# Patient Record
Sex: MALE | Race: WHITE | NOT HISPANIC OR LATINO | Employment: OTHER | URBAN - METROPOLITAN AREA
[De-identification: names, ages, dates, MRNs, and addresses within clinical notes are randomized per-mention and may not be internally consistent; named-entity substitution may affect disease eponyms.]

---

## 2018-04-13 DIAGNOSIS — M54.50 LOW BACK PAIN: ICD-10-CM

## 2018-04-13 DIAGNOSIS — M54.16 LUMBAR RADICULOPATHY: Primary | ICD-10-CM

## 2019-01-22 PROBLEM — I20.9 ANGINA, CLASS III: Status: ACTIVE | Noted: 2019-01-22

## 2019-01-23 PROBLEM — I35.0 NONRHEUMATIC AORTIC VALVE STENOSIS: Status: ACTIVE | Noted: 2019-01-23

## 2019-01-23 PROBLEM — I20.9 ANGINA, CLASS III: Status: RESOLVED | Noted: 2019-01-22 | Resolved: 2019-01-23

## 2019-01-23 PROBLEM — I10 ESSENTIAL HYPERTENSION: Status: ACTIVE | Noted: 2019-01-23

## 2019-02-04 ENCOUNTER — TELEPHONE (OUTPATIENT)
Dept: CARDIOLOGY | Facility: CLINIC | Age: 84
End: 2019-02-04

## 2019-02-04 DIAGNOSIS — I50.9 CONGESTIVE HEART FAILURE, UNSPECIFIED HF CHRONICITY, UNSPECIFIED HEART FAILURE TYPE: ICD-10-CM

## 2019-02-04 DIAGNOSIS — I35.0 AORTIC VALVE STENOSIS, ETIOLOGY OF CARDIAC VALVE DISEASE UNSPECIFIED: Primary | ICD-10-CM

## 2019-02-04 NOTE — TELEPHONE ENCOUNTER
Patient referred for TAVR by Dr. Pickett.  Records reviewed/films on [facility].  Patient would like to call back to schedule.  He is undergoing injections in knee/shoulder and is in a lot of pain.  He will call back and schedule when ready.

## 2019-02-20 ENCOUNTER — OFFICE VISIT (OUTPATIENT)
Dept: CARDIOLOGY | Facility: CLINIC | Age: 84
End: 2019-02-20
Attending: INTERNAL MEDICINE
Payer: MEDICARE

## 2019-02-20 ENCOUNTER — HOSPITAL ENCOUNTER (OUTPATIENT)
Dept: PULMONOLOGY | Facility: CLINIC | Age: 84
Discharge: HOME | End: 2019-02-20
Payer: MEDICARE

## 2019-02-20 ENCOUNTER — HOSPITAL ENCOUNTER (OUTPATIENT)
Dept: RADIOLOGY | Facility: HOSPITAL | Age: 84
Discharge: HOME | End: 2019-02-20
Attending: INTERNAL MEDICINE
Payer: MEDICARE

## 2019-02-20 ENCOUNTER — HOSPITAL ENCOUNTER (OUTPATIENT)
Dept: CARDIOLOGY | Facility: CLINIC | Age: 84
Discharge: HOME | End: 2019-02-20
Attending: INTERNAL MEDICINE
Payer: MEDICARE

## 2019-02-20 VITALS
WEIGHT: 176.13 LBS | HEART RATE: 85 BPM | SYSTOLIC BLOOD PRESSURE: 146 MMHG | HEIGHT: 66 IN | OXYGEN SATURATION: 95 % | BODY MASS INDEX: 28.31 KG/M2 | WEIGHT: 174 LBS | HEIGHT: 66 IN | DIASTOLIC BLOOD PRESSURE: 66 MMHG | SYSTOLIC BLOOD PRESSURE: 136 MMHG | BODY MASS INDEX: 27.97 KG/M2 | DIASTOLIC BLOOD PRESSURE: 66 MMHG | HEART RATE: 95 BPM

## 2019-02-20 VITALS — HEIGHT: 66 IN | WEIGHT: 174 LBS | BODY MASS INDEX: 27.97 KG/M2

## 2019-02-20 DIAGNOSIS — I35.0 NODULAR CALCIFIC AORTIC VALVE STENOSIS: Primary | ICD-10-CM

## 2019-02-20 DIAGNOSIS — I35.0 AORTIC VALVE STENOSIS, ETIOLOGY OF CARDIAC VALVE DISEASE UNSPECIFIED: ICD-10-CM

## 2019-02-20 DIAGNOSIS — N18.9 CHRONIC KIDNEY DISEASE, UNSPECIFIED CKD STAGE: ICD-10-CM

## 2019-02-20 DIAGNOSIS — E78.5 DYSLIPIDEMIA: ICD-10-CM

## 2019-02-20 DIAGNOSIS — I50.9 CONGESTIVE HEART FAILURE, UNSPECIFIED HF CHRONICITY, UNSPECIFIED HEART FAILURE TYPE: ICD-10-CM

## 2019-02-20 DIAGNOSIS — I35.0 NONRHEUMATIC AORTIC VALVE STENOSIS: Primary | ICD-10-CM

## 2019-02-20 DIAGNOSIS — I10 ESSENTIAL HYPERTENSION: ICD-10-CM

## 2019-02-20 DIAGNOSIS — I50.32 CHRONIC DIASTOLIC CONGESTIVE HEART FAILURE: ICD-10-CM

## 2019-02-20 LAB
ASCENDING AORTA: 2.4 CM
AV INDEX (PROSTH): 0.21
AV MEAN GRADIENT: 50.45 MMHG
AV PEAK GRADIENT: 79.57 MMHG
AV VALVE AREA: 0.97 CM2
AV VELOCITY RATIO: 0.22
BSA FOR ECHO PROCEDURE: 1.92 M2
CV ECHO LV RWT: 0.57 CM
DOP CALC AO PEAK VEL: 4.46 M/S
DOP CALC AO VTI: 90.43 CM
DOP CALC LVOT AREA: 4.52 CM2
DOP CALC LVOT DIAMETER: 2.4 CM
DOP CALC LVOT PEAK VEL: 0.96 M/S
DOP CALC LVOT STROKE VOLUME: 87.63 CM3
DOP CALCLVOT PEAK VEL VTI: 19.38 CM
E WAVE DECELERATION TIME: 147.69 MSEC
E/A RATIO: 0.62
E/E' RATIO: 7.71
ECHO LV POSTERIOR WALL: 1.02 CM (ref 0.6–1.1)
FRACTIONAL SHORTENING: 28 % (ref 28–44)
INTERVENTRICULAR SEPTUM: 0.96 CM (ref 0.6–1.1)
IVRT: 0.1 MSEC
LA MAJOR: 4.77 CM
LA MINOR: 4.68 CM
LA WIDTH: 3.42 CM
LEFT ATRIUM SIZE: 3.6 CM
LEFT ATRIUM VOLUME INDEX: 26.2 ML/M2
LEFT ATRIUM VOLUME: 49.44 CM3
LEFT INTERNAL DIMENSION IN SYSTOLE: 2.6 CM (ref 2.1–4)
LEFT VENTRICLE DIASTOLIC VOLUME INDEX: 29.03 ML/M2
LEFT VENTRICLE DIASTOLIC VOLUME: 54.73 ML
LEFT VENTRICLE MASS INDEX: 56.6 G/M2
LEFT VENTRICLE SYSTOLIC VOLUME INDEX: 13 ML/M2
LEFT VENTRICLE SYSTOLIC VOLUME: 24.55 ML
LEFT VENTRICULAR INTERNAL DIMENSION IN DIASTOLE: 3.61 CM (ref 3.5–6)
LEFT VENTRICULAR MASS: 106.79 G
LV LATERAL E/E' RATIO: 7.71
LV SEPTAL E/E' RATIO: 7.71
MV PEAK A VEL: 0.87 M/S
MV PEAK E VEL: 0.54 M/S
PISA TR MAX VEL: 1.62 M/S
PRE FEV1 FVC: 64
PRE FEV1: 1.62
PRE FVC: 2.55
PREDICTED FEV1 FVC: 78
PREDICTED FEV1: 2.11
PREDICTED FVC: 2.76
RA MAJOR: 4.7 CM
RA PRESSURE: 3 MMHG
RA WIDTH: 3.25 CM
RIGHT VENTRICULAR END-DIASTOLIC DIMENSION: 3.97 CM
RV TISSUE DOPPLER FREE WALL SYSTOLIC VELOCITY 1 (APICAL 4 CHAMBER VIEW): 11.05 M/S
SINUS: 3.04 CM
STJ: 2.29 CM
TDI LATERAL: 0.07
TDI SEPTAL: 0.07
TDI: 0.07
TR MAX PG: 10.5 MMHG
TRICUSPID ANNULAR PLANE SYSTOLIC EXCURSION: 1.9 CM
TV REST PULMONARY ARTERY PRESSURE: 13 MMHG

## 2019-02-20 PROCEDURE — 74174 CTA CARDIAC TAVR_PARTNERS (XPD): ICD-10-PCS | Mod: 26,,, | Performed by: RADIOLOGY

## 2019-02-20 PROCEDURE — 99999 PR PBB SHADOW E&M-EST. PATIENT-LVL III: CPT | Mod: PBBFAC,,,

## 2019-02-20 PROCEDURE — 94727 PR PULM FUNCTION TEST BY GAS: ICD-10-PCS | Mod: 26,S$PBB,, | Performed by: INTERNAL MEDICINE

## 2019-02-20 PROCEDURE — 94729 DIFFUSING CAPACITY: CPT | Mod: PBBFAC | Performed by: INTERNAL MEDICINE

## 2019-02-20 PROCEDURE — 99205 PR OFFICE/OUTPT VISIT, NEW, LEVL V, 60-74 MIN: ICD-10-PCS | Mod: S$PBB,,, | Performed by: INTERNAL MEDICINE

## 2019-02-20 PROCEDURE — 94618 PULMONARY STRESS TESTING: CPT | Mod: 26,S$PBB,, | Performed by: INTERNAL MEDICINE

## 2019-02-20 PROCEDURE — 99213 OFFICE O/P EST LOW 20 MIN: CPT | Mod: PBBFAC,25

## 2019-02-20 PROCEDURE — 94729 PR C02/MEMBANE DIFFUSE CAPACITY: ICD-10-PCS | Mod: 26,S$PBB,, | Performed by: INTERNAL MEDICINE

## 2019-02-20 PROCEDURE — 94727 GAS DIL/WSHOT DETER LNG VOL: CPT | Mod: PBBFAC | Performed by: INTERNAL MEDICINE

## 2019-02-20 PROCEDURE — 94010 BREATHING CAPACITY TEST: ICD-10-PCS | Mod: 26,S$PBB,, | Performed by: INTERNAL MEDICINE

## 2019-02-20 PROCEDURE — 25500020 PHARM REV CODE 255

## 2019-02-20 PROCEDURE — 99999 PR PBB SHADOW E&M-EST. PATIENT-LVL III: ICD-10-PCS | Mod: PBBFAC,,,

## 2019-02-20 PROCEDURE — 94618 PULMONARY STRESS TESTING: ICD-10-PCS | Mod: 26,S$PBB,, | Performed by: INTERNAL MEDICINE

## 2019-02-20 PROCEDURE — 71275 CT ANGIOGRAPHY CHEST: CPT | Mod: 26,,, | Performed by: RADIOLOGY

## 2019-02-20 PROCEDURE — 93306 TRANSTHORACIC ECHO (TTE) COMPLETE (CUPID ONLY): ICD-10-PCS | Mod: 26,S$PBB,, | Performed by: INTERNAL MEDICINE

## 2019-02-20 PROCEDURE — 94729 DIFFUSING CAPACITY: CPT | Mod: 26,S$PBB,, | Performed by: INTERNAL MEDICINE

## 2019-02-20 PROCEDURE — 94727 GAS DIL/WSHOT DETER LNG VOL: CPT | Mod: 26,S$PBB,, | Performed by: INTERNAL MEDICINE

## 2019-02-20 PROCEDURE — 99205 OFFICE O/P NEW HI 60 MIN: CPT | Mod: S$PBB,,, | Performed by: INTERNAL MEDICINE

## 2019-02-20 PROCEDURE — 94618 PULMONARY STRESS TESTING: CPT | Mod: PBBFAC | Performed by: INTERNAL MEDICINE

## 2019-02-20 PROCEDURE — 71275 CTA CARDIAC TAVR_PARTNERS (XPD): ICD-10-PCS | Mod: 26,,, | Performed by: RADIOLOGY

## 2019-02-20 PROCEDURE — 74174 CTA ABD&PLVS W/CONTRAST: CPT | Mod: 26,,, | Performed by: RADIOLOGY

## 2019-02-20 PROCEDURE — 94010 BREATHING CAPACITY TEST: CPT | Mod: PBBFAC | Performed by: INTERNAL MEDICINE

## 2019-02-20 PROCEDURE — 71275 CT ANGIOGRAPHY CHEST: CPT | Mod: TC

## 2019-02-20 PROCEDURE — 94010 BREATHING CAPACITY TEST: CPT | Mod: 26,S$PBB,, | Performed by: INTERNAL MEDICINE

## 2019-02-20 PROCEDURE — 93306 TTE W/DOPPLER COMPLETE: CPT | Mod: PBBFAC | Performed by: INTERNAL MEDICINE

## 2019-02-20 RX ORDER — DIPHENHYDRAMINE HCL 25 MG
50 CAPSULE ORAL ONCE
Status: CANCELLED | OUTPATIENT
Start: 2019-02-20 | End: 2019-02-20

## 2019-02-20 RX ORDER — DEXTROSE MONOHYDRATE AND SODIUM CHLORIDE 5; .45 G/100ML; G/100ML
INJECTION, SOLUTION INTRAVENOUS CONTINUOUS
Status: CANCELLED | OUTPATIENT
Start: 2019-02-20

## 2019-02-20 RX ORDER — CLOPIDOGREL BISULFATE 75 MG/1
75 TABLET ORAL DAILY
Qty: 30 TABLET | Refills: 11 | Status: SHIPPED | OUTPATIENT
Start: 2019-02-20 | End: 2020-06-16

## 2019-02-20 NOTE — PROGRESS NOTES
INTERVENTIONAL CARDIOLOGY CLINIC  [facility]    REFERRING PHYSICIAN: Bam Pickett    CHIEF COMPLIANT:  Shortness of breath    HISTORY OF PRESENT ILLNESS  Edmond J Favre is a 87 y.o. male referred by Dr Pickett for evaluation of severe AS (NYHA Class III sx).    Patient has been follewd by Bam Pickett for several years and known to have AS. 4.5 months ago he started toe experience dyspnea on exertion. He gets short of breath doing regular household chores. He had angiogram that showed non obstructive CAD. Outside echo showed severe AS. He was referred for valve replacement options.     The patient has undergone the following TAVR work-up:   ECHO (Date 2.20.19): SUDHAKAR= 0.9 cm2, MG= 50 mmHg, Peak Cyrus= 4.4 m/s, EF= 65%.   LHC (Date 1.23.2019): Non obstructive CAD   STS: 6%   Frailty: 1/4   Iliacs are >9.5 on R and > 9.5 on L   LVOT area by CTA is 4.44 cm2 (27 mm X 21 mm) and Avg Diameter is 23.8 per Dr Crowe  Incidental findings on CT: Asbestosis  CT Surgery risk assessment: high risk, per Dr Damon due to Age  Rhythm issues: NSR  PFTs: FEV1 77% predicted, DLCO 58% predicted.  Comorbidities: Hypertension, shoulder arthritis      Edmond J Favre is a 26 mm  Jermaine S3 valve candidate via Left TF access (18% oversized). (High bifurcation on RCFA)        PAST MEDICAL HISTORY  Past Medical History:   Diagnosis Date    Anxiety     Aortic stenosis, severe     Carotid artery stenosis     bilateral    Depression     Diverticulitis     Hyperlipidemia         PAST SURGICAL HISTORY  Past Surgical History:   Procedure Laterality Date    ANGIOGRAM, CORONARY ARTERY N/A 1/23/2019    [facility]    COLECTOMY         MEDICATIONS  Current Outpatient Medications on File Prior to Visit   Medication Sig Dispense Refill    aspirin 81 MG Chew Take 81 mg by mouth once daily.      doxazosin (CARDURA) 4 MG tablet Take 4 mg by mouth every evening.      FLUoxetine 20 MG capsule Take 20 mg by  mouth once daily.      hydrOXYzine (ATARAX) 50 MG tablet Take 50 mg by mouth 2 (two) times daily.      lisinopril (PRINIVIL,ZESTRIL) 5 MG tablet Take 5 mg by mouth once daily.      multivitamin capsule Take 1 capsule by mouth once daily.      simvastatin (ZOCOR) 40 MG tablet Take 40 mg by mouth every evening.      clotrimazole (LOTRIMIN) 1 % Soln Apply topically 2 (two) times daily.       Current Facility-Administered Medications on File Prior to Visit   Medication Dose Route Frequency Provider Last Rate Last Dose    omnipaque 350 iohexol 100 mL  100 mL Intravenous ONCE PRN Ventura Whiteside MD            SOCIAL HISTORY  TOBACCO: Denies  ETOH: Denies  ILLEGAL DRUGS: Denies      HPI  Review of Systems   Constitution: Negative for fever and weakness.   HENT: Negative for congestion and hoarse voice.    Eyes: Negative for blurred vision and double vision.   Cardiovascular: Positive for dyspnea on exertion. Negative for chest pain, claudication, cyanosis, irregular heartbeat, leg swelling, near-syncope, orthopnea, palpitations and paroxysmal nocturnal dyspnea.   Respiratory: Negative for cough, hemoptysis and shortness of breath.    Endocrine: Negative for cold intolerance and heat intolerance.   Hematologic/Lymphatic: Negative for bleeding problem. Does not bruise/bleed easily.   Skin: Negative for dry skin and nail changes.   Musculoskeletal: Negative for back pain and falls.   Gastrointestinal: Negative for abdominal pain and anorexia.   Neurological: Negative for brief paralysis and dizziness.        Objective:    Physical Exam   Constitutional: He is oriented to person, place, and time. He appears well-developed and well-nourished.   Eyes: Pupils are equal, round, and reactive to light.   Neck: No JVD present. No thyromegaly present.   Cardiovascular: Normal rate, regular rhythm and intact distal pulses.   Murmur heard.   Harsh midsystolic murmur is present with a grade of 3/6 at the upper right sternal  border radiating to the neck.  Pulses:       Carotid pulses are 2+ on the right side, and 2+ on the left side.       Radial pulses are 2+ on the right side, and 2+ on the left side.        Femoral pulses are 2+ on the right side, and 2+ on the left side.       Popliteal pulses are 2+ on the right side, and 2+ on the left side.        Dorsalis pedis pulses are 2+ on the right side, and 2+ on the left side.        Posterior tibial pulses are 2+ on the right side, and 2+ on the left side.   Pulmonary/Chest: No respiratory distress. He has no wheezes. He exhibits no tenderness.   Abdominal: He exhibits no distension. There is no tenderness. There is no rebound.   Musculoskeletal: He exhibits no edema or tenderness.   Neurological: He is alert and oriented to person, place, and time.   Skin: Skin is warm and dry.   Psychiatric: He has a normal mood and affect.         Assessment:       1. Nonrheumatic aortic valve stenosis    2. Essential hypertension    3. Chronic diastolic congestive heart failure    4. Dyslipidemia         Plan:         Essential hypertension  Controlled    Dyslipidemia  On high intensity statin Rx    Congestive heart failure  Secondary to severe AS    TAVR work-up:   ECHO (Date 2.20.19): SUDHAKAR= 0.9 cm2, MG= 50 mmHg, Peak Cyrus= 4.4 m/s, EF= 65%.   [facility] (Date 1.23.2019): Non obstructive CAD   STS: 6%   Frailty: 1/4   Iliacs are >9.5 on R and > 9.5 on L   LVOT area by CTA is 4.44 cm2 (27 mm X 21 mm) and Avg Diameter is 23.8 per Dr Crowe  Incidental findings on CT: Asbestosis  CT Surgery risk assessment: high risk, per Dr Damon due to Age  Rhythm issues: NSR  PFTs: FEV1 77% predicted, DLCO 58% predicted.  Comorbidities: Hypertension, shoulder arthritis    Edmond J Favre is a 26 mm  Jermaine S3 valve candidate via Left TF access (18% oversized). (High bifurcation on RCFA)      Nonrheumatic aortic valve stenosis  Transcatheter Aortic valve replacement   1. Valve: 26 mm Jermaine S3  2. TAVR access:  LTF  3. Valvuloplasty balloon: 20 True  4. Viabahn size if needed: 10X5  5. Antithrombotic therapy: ASA, start plavix  6. Pacemaker risk factors: NSR  1. The patient was explained the the procedure carries around a 12.5% risk of permanent pacemaker requirement and that risk depends on the patients underlying conduction system.  7. Patient was educated abut the pathophysiology and natural history of severe aortic stenosis and was educated about the treatment options including surgical and transcatheter valve replacement. She agrees to be full code for the forseable future and to undergo workup for treatment of severe AS.   8. The risks, benefits, and alternatives of transcatheter aortic valve replacement were discussed with the patient. All questions were answered and informed consent was obtained. I had a detailed discussion with the patient regarding risk of stroke, MI, bleeding access site complications including limb loss, allergy, kidney failure including dialysis and death.  9. The patient understands the risks and benefits and wishes to go ahead with the procedure.  10. All patient's questions were answered          Cristian Crowe MD [facility]  Interventional Cardiology  Structural/Valvular heart disease  [phone]

## 2019-02-20 NOTE — H&P (VIEW-ONLY)
INTERVENTIONAL CARDIOLOGY CLINIC  [facility]    REFERRING PHYSICIAN: Bam Pickett    CHIEF COMPLIANT:  Shortness of breath    HISTORY OF PRESENT ILLNESS  Edmond J Favre is a 87 y.o. male referred by Dr Pickett for evaluation of severe AS (NYHA Class III sx).    Patient has been follewd by Bam Pickett for several years and known to have AS. 4.5 months ago he started toe experience dyspnea on exertion. He gets short of breath doing regular household chores. He had angiogram that showed non obstructive CAD. Outside echo showed severe AS. He was referred for valve replacement options.     The patient has undergone the following TAVR work-up:   ECHO (Date 2.20.19): SUDHAKAR= 0.9 cm2, MG= 50 mmHg, Peak Cyrus= 4.4 m/s, EF= 65%.   LHC (Date 1.23.2019): Non obstructive CAD   STS: 6%   Frailty: 1/4   Iliacs are >9.5 on R and > 9.5 on L   LVOT area by CTA is 4.44 cm2 (27 mm X 21 mm) and Avg Diameter is 23.8 per Dr Crowe  Incidental findings on CT: Asbestosis  CT Surgery risk assessment: high risk, per Dr Damon due to Age  Rhythm issues: NSR  PFTs: FEV1 77% predicted, DLCO 58% predicted.  Comorbidities: Hypertension, shoulder arthritis      Edmond J Favre is a 26 mm  Jermaine S3 valve candidate via Left TF access (18% oversized). (High bifurcation on RCFA)        PAST MEDICAL HISTORY  Past Medical History:   Diagnosis Date    Anxiety     Aortic stenosis, severe     Carotid artery stenosis     bilateral    Depression     Diverticulitis     Hyperlipidemia         PAST SURGICAL HISTORY  Past Surgical History:   Procedure Laterality Date    ANGIOGRAM, CORONARY ARTERY N/A 1/23/2019    Performed by [facility] at [facility] C[facility] LAB    COLECTOMY         MEDICATIONS  Current Outpatient Medications on File Prior to Visit   Medication Sig Dispense Refill    aspirin 81 MG Chew Take 81 mg by mouth once daily.      doxazosin (CARDURA) 4 MG tablet Take 4 mg by mouth every evening.      FLUoxetine 20 MG capsule Take 20 mg by  mouth once daily.      hydrOXYzine (ATARAX) 50 MG tablet Take 50 mg by mouth 2 (two) times daily.      lisinopril (PRINIVIL,ZESTRIL) 5 MG tablet Take 5 mg by mouth once daily.      multivitamin capsule Take 1 capsule by mouth once daily.      simvastatin (ZOCOR) 40 MG tablet Take 40 mg by mouth every evening.      clotrimazole (LOTRIMIN) 1 % Soln Apply topically 2 (two) times daily.       Current Facility-Administered Medications on File Prior to Visit   Medication Dose Route Frequency Provider Last Rate Last Dose    omnipaque 350 iohexol 100 mL  100 mL Intravenous ONCE PRN Ventura Whiteside MD            SOCIAL HISTORY  TOBACCO: Denies  ETOH: Denies  ILLEGAL DRUGS: Denies      HPI  Review of Systems   Constitution: Negative for fever and weakness.   HENT: Negative for congestion and hoarse voice.    Eyes: Negative for blurred vision and double vision.   Cardiovascular: Positive for dyspnea on exertion. Negative for chest pain, claudication, cyanosis, irregular heartbeat, leg swelling, near-syncope, orthopnea, palpitations and paroxysmal nocturnal dyspnea.   Respiratory: Negative for cough, hemoptysis and shortness of breath.    Endocrine: Negative for cold intolerance and heat intolerance.   Hematologic/Lymphatic: Negative for bleeding problem. Does not bruise/bleed easily.   Skin: Negative for dry skin and nail changes.   Musculoskeletal: Negative for back pain and falls.   Gastrointestinal: Negative for abdominal pain and anorexia.   Neurological: Negative for brief paralysis and dizziness.        Objective:    Physical Exam   Constitutional: He is oriented to person, place, and time. He appears well-developed and well-nourished.   Eyes: Pupils are equal, round, and reactive to light.   Neck: No JVD present. No thyromegaly present.   Cardiovascular: Normal rate, regular rhythm and intact distal pulses.   Murmur heard.   Harsh midsystolic murmur is present with a grade of 3/6 at the upper right sternal  border radiating to the neck.  Pulses:       Carotid pulses are 2+ on the right side, and 2+ on the left side.       Radial pulses are 2+ on the right side, and 2+ on the left side.        Femoral pulses are 2+ on the right side, and 2+ on the left side.       Popliteal pulses are 2+ on the right side, and 2+ on the left side.        Dorsalis pedis pulses are 2+ on the right side, and 2+ on the left side.        Posterior tibial pulses are 2+ on the right side, and 2+ on the left side.   Pulmonary/Chest: No respiratory distress. He has no wheezes. He exhibits no tenderness.   Abdominal: He exhibits no distension. There is no tenderness. There is no rebound.   Musculoskeletal: He exhibits no edema or tenderness.   Neurological: He is alert and oriented to person, place, and time.   Skin: Skin is warm and dry.   Psychiatric: He has a normal mood and affect.         Assessment:       1. Nonrheumatic aortic valve stenosis    2. Essential hypertension    3. Chronic diastolic congestive heart failure    4. Dyslipidemia         Plan:         Essential hypertension  Controlled    Dyslipidemia  On high intensity statin Rx    Congestive heart failure  Secondary to severe AS    TAVR work-up:   ECHO (Date 2.20.19): SUDHAKAR= 0.9 cm2, MG= 50 mmHg, Peak Cyrus= 4.4 m/s, EF= 65%.   [facility] (Date 1.23.2019): Non obstructive CAD   STS: 6%   Frailty: 1/4   Iliacs are >9.5 on R and > 9.5 on L   LVOT area by CTA is 4.44 cm2 (27 mm X 21 mm) and Avg Diameter is 23.8 per Dr Crowe  Incidental findings on CT: Asbestosis  CT Surgery risk assessment: high risk, per Dr Damon due to Age  Rhythm issues: NSR  PFTs: FEV1 77% predicted, DLCO 58% predicted.  Comorbidities: Hypertension, shoulder arthritis    Edmond J Favre is a 26 mm  Jermaine S3 valve candidate via Left TF access (18% oversized). (High bifurcation on RCFA)      Nonrheumatic aortic valve stenosis  Transcatheter Aortic valve replacement   1. Valve: 26 mm Jermaine S3  2. TAVR access:  LTF  3. Valvuloplasty balloon: 20 True  4. Viabahn size if needed: 10X5  5. Antithrombotic therapy: ASA, start plavix  6. Pacemaker risk factors: NSR  1. The patient was explained the the procedure carries around a 12.5% risk of permanent pacemaker requirement and that risk depends on the patients underlying conduction system.  7. Patient was educated abut the pathophysiology and natural history of severe aortic stenosis and was educated about the treatment options including surgical and transcatheter valve replacement. She agrees to be full code for the forseable future and to undergo workup for treatment of severe AS.   8. The risks, benefits, and alternatives of transcatheter aortic valve replacement were discussed with the patient. All questions were answered and informed consent was obtained. I had a detailed discussion with the patient regarding risk of stroke, MI, bleeding access site complications including limb loss, allergy, kidney failure including dialysis and death.  9. The patient understands the risks and benefits and wishes to go ahead with the procedure.  10. All patient's questions were answered          Cristian Crowe MD [facility]  Interventional Cardiology  Structural/Valvular heart disease  [phone]

## 2019-02-20 NOTE — LETTER
February 20, 2019      Ventura Whiteside MD  [address]  [facility] [address]           [facility]-Inter[facility] Card  [address]  [facility] [address]  Phone: [phone]          Patient: Edmond J Favre   MR Number: [id number]   YOB: 1931   Date of Visit: 2/20/2019       Dear Dr. Ventura Whiteside:    Thank you for referring Edmond Favre to me for evaluation. Attached you will find relevant portions of my assessment and plan of care.    If you have questions, please do not hesitate to call me. I look forward to following Edmond Favre along with you.    Sincerely,    Cristian Zambrano MD    Enclosure  CC:  No Recipients    If you would like to receive this communication electronically, please contact [email or web address] or [phone] to request more information on [facility] Link access.    For providers and/or their staff who would like to refer a patient to [facility], please contact us through our one-stop-shop provider referral line, [facility], at [phone].    If you feel you have received this communication in error or would no longer like to receive these types of communications, please e-mail [email or web address]

## 2019-02-20 NOTE — ASSESSMENT & PLAN NOTE
Transcatheter Aortic valve replacement   1. Valve: 26 mm Jermaine S3  2. TAVR access: LTF  3. Valvuloplasty balloon: 20 True  4. Viabahn size if needed: 10X5  5. Antithrombotic therapy: ASA, start plavix  6. Pacemaker risk factors: NSR  1. The patient was explained the the procedure carries around a 12.5% risk of permanent pacemaker requirement and that risk depends on the patients underlying conduction system.  7. Patient was educated abut the pathophysiology and natural history of severe aortic stenosis and was educated about the treatment options including surgical and transcatheter valve replacement. She agrees to be full code for the forseable future and to undergo workup for treatment of severe AS.   8. The risks, benefits, and alternatives of transcatheter aortic valve replacement were discussed with the patient. All questions were answered and informed consent was obtained. I had a detailed discussion with the patient regarding risk of stroke, MI, bleeding access site complications including limb loss, allergy, kidney failure including dialysis and death.  9. The patient understands the risks and benefits and wishes to go ahead with the procedure.  10. All patient's questions were answered

## 2019-02-20 NOTE — ASSESSMENT & PLAN NOTE
Secondary to severe AS    TAVR work-up:   ECHO (Date 2.20.19): SUDHAKAR= 0.9 cm2, MG= 50 mmHg, Peak Cyrus= 4.4 m/s, EF= 65%.   [facility] (Date 1.23.2019): Non obstructive CAD   STS: 6%   Frailty: 1/4   Iliacs are >9.5 on R and > 9.5 on L   LVOT area by CTA is 4.44 cm2 (27 mm X 21 mm) and Avg Diameter is 23.8 per Dr Crowe  Incidental findings on CT: Asbestosis  CT Surgery risk assessment: high risk, per Dr Damon due to Age  Rhythm issues: NSR  PFTs: FEV1 77% predicted, DLCO 58% predicted.  Comorbidities: Hypertension, shoulder arthritis    Edmond J Favre is a 26 mm  Jermaine S3 valve candidate via Left TF access (18% oversized). (High bifurcation on RCFA)

## 2019-02-21 NOTE — PROCEDURES
Edmond J Favre is a 87 y.o.  male patient, who presents for a 6 minute walk test ordered by Ventura Whiteside MD.  The diagnosis is Aortic Valve Disorder.  The patient's BMI is 28.1 kg/m2.  Predicted distance (lower limit of normal) is 225.58 meters.      Test Results:    The test was completed without stopping.  The total time walked was 360 seconds.  During walking, the patient reported:  Dyspnea.  The patient used no assistive devices during testing.     02/20/2019---------Distance: 304.8 meters (1000 feet)     O2 Sat % Supplemental Oxygen Heart Rate Blood Pressure Loida Scale   Pre-exercise  (Resting) 98 % Room Air 90 bpm 149/71 mmHg 2   During Exercise 92 % Room Air 112 bpm 156/70 mmHg 3   Post-exercise  (Recovery) 97 % Room Air  103 bpm       Recovery Time:  85 seconds    Performing nurse/tech:  Natalie MARSHALL      PREVIOUS STUDY:   The patient has not had a previous study.      CLINICAL INTERPRETATION:  Six minute walk distance is 304.8 meters (1000 feet) with moderate dyspnea.  During exercise, there was significant desaturation while breathing room air.  Blood pressure remained stable and Heart rate increased significantly with walking.  The patient did not report non-pulmonary symptoms during exercise.  No previous study performed.  Based upon age and body mass index, exercise capacity is normal.

## 2019-03-07 ENCOUNTER — TELEPHONE (OUTPATIENT)
Dept: CARDIOLOGY | Facility: CLINIC | Age: 84
End: 2019-03-07

## 2019-03-07 NOTE — TELEPHONE ENCOUNTER
Patient's daughter called this am stating that her father woke up this morning urinating blood.  Instructed per Dr Whiteside to stop his plavix and to see his MD.

## 2019-03-13 ENCOUNTER — ANESTHESIA EVENT (OUTPATIENT)
Dept: MEDSURG UNIT | Facility: HOSPITAL | Age: 84
DRG: 266 | End: 2019-03-13
Payer: MEDICARE

## 2019-03-13 NOTE — ANESTHESIA PREPROCEDURE EVALUATION
03/13/2019  Pre-operative evaluation for Procedure(s) (LRB):  Replacement-valve-aortic (N/A)    Edmond J Favre is a 88 y.o. male with PMH of bilateral carotid artery stenosis, HLD, nonobstructive CAD, and severe AS who is scheduled for TAVR.     Prev airway: None on file    Patient Active Problem List   Diagnosis    Nonrheumatic aortic valve stenosis    Essential hypertension    Congestive heart failure    Dyslipidemia       Review of patient's allergies indicates:   Allergen Reactions    Pcn [penicillins]         No current facility-administered medications on file prior to encounter.      Current Outpatient Medications on File Prior to Encounter   Medication Sig Dispense Refill    aspirin 81 MG Chew Take 81 mg by mouth once daily.      clopidogrel (PLAVIX) 75 mg tablet Take 1 tablet (75 mg total) by mouth once daily. 30 tablet 11    clotrimazole (LOTRIMIN) 1 % Soln Apply topically 2 (two) times daily.      doxazosin (CARDURA) 4 MG tablet Take 4 mg by mouth every evening.      FLUoxetine 20 MG capsule Take 20 mg by mouth once daily.      hydrOXYzine (ATARAX) 50 MG tablet Take 50 mg by mouth 2 (two) times daily.      lisinopril (PRINIVIL,ZESTRIL) 5 MG tablet Take 5 mg by mouth once daily.      multivitamin capsule Take 1 capsule by mouth once daily.      simvastatin (ZOCOR) 40 MG tablet Take 40 mg by mouth every evening.         Past Surgical History:   Procedure Laterality Date    ANGIOGRAM, CORONARY ARTERY N/A 1/23/2019    Performed by Edd Pickett MD at [facility]    COLECTOMY         Social History     Socioeconomic History    Marital status:      Spouse name: Not on file    Number of children: Not on file    Years of education: Not on file    Highest education level: Not on file   Social Needs    Financial resource strain: Not on file    Food insecurity - worry:  Not on file    Food insecurity - inability: Not on file    Transportation needs - medical: Not on file    Transportation needs - non-medical: Not on file   Occupational History    Not on file   Tobacco Use    Smoking status: Former Smoker     Types: Cigarettes     Last attempt to quit: 1985     Years since quittin.2    Smokeless tobacco: Never Used   Substance and Sexual Activity    Alcohol use: Yes     Comment: glass of wine with dinner    Drug use: Not on file    Sexual activity: No   Other Topics Concern    Not on file   Social History Narrative    Not on file         Vital Signs Range (Last 24H):  BP: ()/()   Arterial Line BP: ()/()       CBC: No results for input(s): WBC, RBC, HGB, HCT, PLT, MCV, MCH, MCHC in the last 72 hours.    CMP: No results for input(s): NA, K, CL, CO2, BUN, CREATININE, GLU, MG, PHOS, CALCIUM, ALBUMIN, PROT, ALKPHOS, ALT, AST, BILITOT in the last 72 hours.    INR  No results for input(s): PT, INR, PROTIME, APTT in the last 72 hours.    EKG:  Sinus rhythm with 1st degree A-V block  Otherwise normal ECG  No previous ECGs available  Confirmed by Akhil Royal MD ([id number]) on 2019 3:40:47 PM    2D Echo 19:  · Normal left ventricular systolic function. The estimated ejection fraction is 65%  · Concentric left ventricular remodeling.  · Normal right ventricular systolic function.  · Indeterminate left ventricular diastolic function.  · Moderate to Severe aortic valve stenosis. Aortic valve area is 0.81 cm2; peak velocity is 4.46 m/s; mean gradient is 50.45 mmHg. DI= 0.21, Visually the valve appears moderate to severely stenotic but by doppler hemodynamics there is severe AS.  · Inadeqaute TR envelope to assess PA pressure  · Normal central venous pressure (3 mm Hg).    C 19:  Left Main   The vessel was visualized by angiography, is large and is angiographically normal.   Left Anterior Descending   The vessel was visualized by angiography and is large. The vessel  exhibits minimal luminal irregularities. 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   First Diagonal Branch   The vessel was visualized by angiography and is moderate in size.   Second Diagonal Branch   The vessel was visualized by angiography and is small.   Left Circumflex   The vessel was visualized by angiography and is moderate in size. The vessel exhibits minimal luminal irregularities.   Prox Cx lesion is 40% stenosed. The lesion is calcified. Calcification amount is moderate. Lesion proximal tortuosity is moderate. Segment angulation is 45-90 degrees. The lesion is a type B lesion.   First Obtuse Marginal Branch   The vessel was visualized by angiography and is moderate in size. The vessel exhibits minimal luminal irregularities.   Second Obtuse Marginal Branch   The vessel was visualized by angiography and is moderate in size. The vessel exhibits minimal luminal irregularities.   Right Coronary Artery   The vessel was visualized by angiography and is large. The vessel exhibits minimal luminal irregularities.         Anesthesia Evaluation         Review of Systems      Physical Exam   Airway/Jaw/Neck:  Airway Findings: Mouth Opening: Normal Tongue: Normal  General Airway Assessment: Adult, Good  Mallampati: II  TM Distance: Normal, at least 6 cm       Chest/Lungs:  Chest/Lungs Findings: Normal Respiratory Rate         Mental Status:  Mental Status Findings:  Cooperative, Alert and Oriented         Anesthesia Plan  Type of Anesthesia, risks & benefits discussed:  Anesthesia Type:  general  Patient's Preference: General  Intra-op Monitoring Plan: standard ASA monitors, [place], central line and arterial line  Intra-op Monitoring Plan Comments:   Post Op Pain Control Plan: per primary service following discharge from PACU  Post Op Pain Control Plan Comments: Per primary service  Induction:   IV  Beta Blocker:  Patient is not currently on a Beta-Blocker (No further documentation required).       Informed  Consent: Patient understands risks and agrees with Anesthesia plan.  Questions answered. Anesthesia consent signed with patient.  ASA Score: 4     Day of Surgery Review of History & Physical:    H&P update referred to the surgeon.         Ready For Surgery From Anesthesia Perspective.

## 2019-03-14 ENCOUNTER — ANESTHESIA (OUTPATIENT)
Dept: MEDSURG UNIT | Facility: HOSPITAL | Age: 84
DRG: 266 | End: 2019-03-14
Payer: MEDICARE

## 2019-03-14 ENCOUNTER — HOSPITAL ENCOUNTER (INPATIENT)
Facility: HOSPITAL | Age: 84
LOS: 2 days | Discharge: HOME | DRG: 266 | End: 2019-03-16
Attending: INTERNAL MEDICINE | Admitting: INTERNAL MEDICINE
Payer: MEDICARE

## 2019-03-14 DIAGNOSIS — N18.9 CHRONIC KIDNEY DISEASE, UNSPECIFIED CKD STAGE: ICD-10-CM

## 2019-03-14 DIAGNOSIS — I35.0 NODULAR CALCIFIC AORTIC VALVE STENOSIS: ICD-10-CM

## 2019-03-14 DIAGNOSIS — I44.7 LEFT BUNDLE BRANCH BLOCK: Primary | ICD-10-CM

## 2019-03-14 DIAGNOSIS — Z95.2 S/P TAVR (TRANSCATHETER AORTIC VALVE REPLACEMENT): ICD-10-CM

## 2019-03-14 LAB
ABO + RH BLD: NORMAL
ANION GAP SERPL CALC-SCNC: 9 MMOL/L
APTT BLDCRRT: 21.3 SEC
BASOPHILS # BLD AUTO: 0.05 K/UL
BASOPHILS NFR BLD: 0.6 %
BLD GP AB SCN CELLS X3 SERPL QL: NORMAL
BUN SERPL-MCNC: 17 MG/DL
CALCIUM SERPL-MCNC: 9 MG/DL
CHLORIDE SERPL-SCNC: 105 MMOL/L
CO2 SERPL-SCNC: 25 MMOL/L
CREAT SERPL-MCNC: 1 MG/DL
DIFFERENTIAL METHOD: ABNORMAL
EOSINOPHIL # BLD AUTO: 0.1 K/UL
EOSINOPHIL NFR BLD: 1.4 %
ERYTHROCYTE [DISTWIDTH] IN BLOOD BY AUTOMATED COUNT: 14.1 %
ERYTHROCYTE [DISTWIDTH] IN BLOOD BY AUTOMATED COUNT: 14.1 %
EST. GFR  (AFRICAN AMERICAN): >60 ML/MIN/1.73 M^2
EST. GFR  (NON AFRICAN AMERICAN): >60 ML/MIN/1.73 M^2
GLUCOSE SERPL-MCNC: 100 MG/DL
HCT VFR BLD AUTO: 25.8 %
HCT VFR BLD AUTO: 29.1 %
HGB BLD-MCNC: 8.4 G/DL
HGB BLD-MCNC: 9.6 G/DL
IMM GRANULOCYTES # BLD AUTO: 0.03 K/UL
IMM GRANULOCYTES NFR BLD AUTO: 0.4 %
INR PPP: 0.9
LYMPHOCYTES # BLD AUTO: 0.5 K/UL
LYMPHOCYTES NFR BLD: 5.5 %
MCH RBC QN AUTO: 32.4 PG
MCH RBC QN AUTO: 32.6 PG
MCHC RBC AUTO-ENTMCNC: 32.6 G/DL
MCHC RBC AUTO-ENTMCNC: 33 G/DL
MCV RBC AUTO: 100 FL
MCV RBC AUTO: 98 FL
MONOCYTES # BLD AUTO: 0.5 K/UL
MONOCYTES NFR BLD: 6.3 %
NEUTROPHILS # BLD AUTO: 7.3 K/UL
NEUTROPHILS NFR BLD: 85.8 %
NRBC BLD-RTO: 0 /100 WBC
PLATELET # BLD AUTO: 191 K/UL
PLATELET # BLD AUTO: 245 K/UL
PMV BLD AUTO: 8.9 FL
PMV BLD AUTO: 9.1 FL
POTASSIUM SERPL-SCNC: 4.1 MMOL/L
PROTHROMBIN TIME: 9.6 SEC
RBC # BLD AUTO: 2.58 M/UL
RBC # BLD AUTO: 2.96 M/UL
SODIUM SERPL-SCNC: 139 MMOL/L
WBC # BLD AUTO: 6.49 K/UL
WBC # BLD AUTO: 8.51 K/UL

## 2019-03-14 PROCEDURE — 20000000 HC ICU ROOM

## 2019-03-14 PROCEDURE — 33210 INSERT ELECTRD/PM CATH SNGL: CPT | Mod: 59,Q0,GC, | Performed by: ANESTHESIOLOGY

## 2019-03-14 PROCEDURE — 93010 EKG 12-LEAD: ICD-10-PCS | Mod: ,,, | Performed by: INTERNAL MEDICINE

## 2019-03-14 PROCEDURE — C1769 GUIDE WIRE: HCPCS | Performed by: INTERNAL MEDICINE

## 2019-03-14 PROCEDURE — C1725 CATH, TRANSLUMIN NON-LASER: HCPCS | Performed by: INTERNAL MEDICINE

## 2019-03-14 PROCEDURE — S0077 INJECTION, CLINDAMYCIN PHOSP: HCPCS | Performed by: ANESTHESIOLOGY

## 2019-03-14 PROCEDURE — 27000239 HC STAND-BY BYPASS PUMP

## 2019-03-14 PROCEDURE — 33210 PR INSER HEART TEMP PACER ONE CHMBR: ICD-10-PCS | Mod: 59,Q0,GC, | Performed by: ANESTHESIOLOGY

## 2019-03-14 PROCEDURE — 25000003 PHARM REV CODE 250: Performed by: INTERNAL MEDICINE

## 2019-03-14 PROCEDURE — 85027 COMPLETE CBC AUTOMATED: CPT

## 2019-03-14 PROCEDURE — 25000003 PHARM REV CODE 250: Performed by: ANESTHESIOLOGY

## 2019-03-14 PROCEDURE — 27800903 OPTIME MED/SURG SUP & DEVICES OTHER IMPLANTS: Performed by: INTERNAL MEDICINE

## 2019-03-14 PROCEDURE — 37000009 HC ANESTHESIA EA ADD 15 MINS: Performed by: INTERNAL MEDICINE

## 2019-03-14 PROCEDURE — 93005 ELECTROCARDIOGRAM TRACING: CPT

## 2019-03-14 PROCEDURE — 25000003 PHARM REV CODE 250: Performed by: STUDENT IN AN ORGANIZED HEALTH CARE EDUCATION/TRAINING PROGRAM

## 2019-03-14 PROCEDURE — 86901 BLOOD TYPING SEROLOGIC RH(D): CPT

## 2019-03-14 PROCEDURE — 33361 REPLACE AORTIC VALVE PERQ: CPT | Mod: 62,Q0,, | Performed by: INTERNAL MEDICINE

## 2019-03-14 PROCEDURE — 33361 REPLACE AORTIC VALVE PERQ: CPT | Mod: Q0 | Performed by: INTERNAL MEDICINE

## 2019-03-14 PROCEDURE — 93010 ELECTROCARDIOGRAM REPORT: CPT | Mod: 76,,, | Performed by: INTERNAL MEDICINE

## 2019-03-14 PROCEDURE — 80048 BASIC METABOLIC PNL TOTAL CA: CPT

## 2019-03-14 PROCEDURE — 63600175 PHARM REV CODE 636 W HCPCS: Performed by: ANESTHESIOLOGY

## 2019-03-14 PROCEDURE — 27201423 OPTIME MED/SURG SUP & DEVICES STERILE SUPPLY: Performed by: INTERNAL MEDICINE

## 2019-03-14 PROCEDURE — 25500020 PHARM REV CODE 255: Performed by: INTERNAL MEDICINE

## 2019-03-14 PROCEDURE — 85025 COMPLETE CBC W/AUTO DIFF WBC: CPT

## 2019-03-14 PROCEDURE — 85730 THROMBOPLASTIN TIME PARTIAL: CPT

## 2019-03-14 PROCEDURE — 33361 PR TAVR, PERCUTANEOUS FEMORAL: ICD-10-PCS | Mod: 62,Q0,, | Performed by: INTERNAL MEDICINE

## 2019-03-14 PROCEDURE — D9220A PRA ANESTHESIA: ICD-10-PCS | Mod: Q0,GC,, | Performed by: ANESTHESIOLOGY

## 2019-03-14 PROCEDURE — 33361 REPLACE AORTIC VALVE PERQ: CPT | Mod: 62,Q0,, | Performed by: THORACIC SURGERY (CARDIOTHORACIC VASCULAR SURGERY)

## 2019-03-14 PROCEDURE — 33361 PR TAVR, PERCUTANEOUS FEMORAL: ICD-10-PCS | Mod: 62,Q0,, | Performed by: THORACIC SURGERY (CARDIOTHORACIC VASCULAR SURGERY)

## 2019-03-14 PROCEDURE — 27000191 HC C-V MONITORING

## 2019-03-14 PROCEDURE — C1894 INTRO/SHEATH, NON-LASER: HCPCS | Performed by: INTERNAL MEDICINE

## 2019-03-14 PROCEDURE — S5010 5% DEXTROSE AND 0.45% SALINE: HCPCS | Performed by: INTERNAL MEDICINE

## 2019-03-14 PROCEDURE — 37000008 HC ANESTHESIA 1ST 15 MINUTES: Performed by: INTERNAL MEDICINE

## 2019-03-14 PROCEDURE — S0077 INJECTION, CLINDAMYCIN PHOSP: HCPCS | Performed by: INTERNAL MEDICINE

## 2019-03-14 PROCEDURE — 36620 PR INSERT CATH,ART,PERCUT,SHORTTERM: ICD-10-PCS | Mod: 59,Q0,GC, | Performed by: ANESTHESIOLOGY

## 2019-03-14 PROCEDURE — D9220A PRA ANESTHESIA: Mod: Q0,GC,, | Performed by: ANESTHESIOLOGY

## 2019-03-14 PROCEDURE — 99223 1ST HOSP IP/OBS HIGH 75: CPT | Mod: ,,, | Performed by: INTERNAL MEDICINE

## 2019-03-14 PROCEDURE — A4216 STERILE WATER/SALINE, 10 ML: HCPCS | Performed by: ANESTHESIOLOGY

## 2019-03-14 PROCEDURE — C1751 CATH, INF, PER/CENT/MIDLINE: HCPCS | Performed by: ANESTHESIOLOGY

## 2019-03-14 PROCEDURE — 85610 PROTHROMBIN TIME: CPT

## 2019-03-14 PROCEDURE — 36620 INSERTION CATHETER ARTERY: CPT | Mod: 59,Q0,GC, | Performed by: ANESTHESIOLOGY

## 2019-03-14 PROCEDURE — 93010 ELECTROCARDIOGRAM REPORT: CPT | Mod: ,,, | Performed by: INTERNAL MEDICINE

## 2019-03-14 PROCEDURE — 99223 PR INITIAL HOSPITAL CARE,LEVL III: ICD-10-PCS | Mod: ,,, | Performed by: INTERNAL MEDICINE

## 2019-03-14 PROCEDURE — C1760 CLOSURE DEV, VASC: HCPCS | Performed by: INTERNAL MEDICINE

## 2019-03-14 DEVICE — VALVE COREVALVE TAV 26MM: Type: IMPLANTABLE DEVICE | Site: HEART | Status: FUNCTIONAL

## 2019-03-14 RX ORDER — DEXTROSE MONOHYDRATE AND SODIUM CHLORIDE 5; .45 G/100ML; G/100ML
INJECTION, SOLUTION INTRAVENOUS CONTINUOUS
Status: DISCONTINUED | OUTPATIENT
Start: 2019-03-14 | End: 2019-03-15

## 2019-03-14 RX ORDER — MEPERIDINE HYDROCHLORIDE 50 MG/ML
12.5 INJECTION INTRAMUSCULAR; INTRAVENOUS; SUBCUTANEOUS ONCE AS NEEDED
Status: DISCONTINUED | OUTPATIENT
Start: 2019-03-14 | End: 2019-03-14 | Stop reason: HOSPADM

## 2019-03-14 RX ORDER — PHENYLEPHRINE HCL IN 0.9% NACL 1 MG/10 ML
SYRINGE (ML) INTRAVENOUS
Status: DISCONTINUED | OUTPATIENT
Start: 2019-03-14 | End: 2019-03-14

## 2019-03-14 RX ORDER — CLINDAMYCIN PHOSPHATE 900 MG/50ML
INJECTION, SOLUTION INTRAVENOUS
Status: DISCONTINUED | OUTPATIENT
Start: 2019-03-14 | End: 2019-03-14

## 2019-03-14 RX ORDER — FLUOXETINE HYDROCHLORIDE 20 MG/1
20 CAPSULE ORAL DAILY
Status: DISCONTINUED | OUTPATIENT
Start: 2019-03-15 | End: 2019-03-16 | Stop reason: HOSPADM

## 2019-03-14 RX ORDER — LANOLIN ALCOHOL/MO/W.PET/CERES
800 CREAM (GRAM) TOPICAL
Status: DISCONTINUED | OUTPATIENT
Start: 2019-03-14 | End: 2019-03-16 | Stop reason: HOSPADM

## 2019-03-14 RX ORDER — CLOPIDOGREL BISULFATE 75 MG/1
75 TABLET ORAL DAILY
Status: DISCONTINUED | OUTPATIENT
Start: 2019-03-15 | End: 2019-03-16 | Stop reason: HOSPADM

## 2019-03-14 RX ORDER — POTASSIUM CHLORIDE 20 MEQ/15ML
40 SOLUTION ORAL
Status: DISCONTINUED | OUTPATIENT
Start: 2019-03-14 | End: 2019-03-16 | Stop reason: HOSPADM

## 2019-03-14 RX ORDER — NOREPINEPHRINE BITARTRATE/D5W 4MG/250ML
0.02 PLASTIC BAG, INJECTION (ML) INTRAVENOUS CONTINUOUS
Status: DISCONTINUED | OUTPATIENT
Start: 2019-03-14 | End: 2019-03-15

## 2019-03-14 RX ORDER — SODIUM CHLORIDE 0.9 % (FLUSH) 0.9 %
3 SYRINGE (ML) INJECTION
Status: DISCONTINUED | OUTPATIENT
Start: 2019-03-14 | End: 2019-03-14 | Stop reason: HOSPADM

## 2019-03-14 RX ORDER — SODIUM,POTASSIUM PHOSPHATES 280-250MG
2 POWDER IN PACKET (EA) ORAL
Status: DISCONTINUED | OUTPATIENT
Start: 2019-03-14 | End: 2019-03-16 | Stop reason: HOSPADM

## 2019-03-14 RX ORDER — ONDANSETRON 2 MG/ML
4 INJECTION INTRAMUSCULAR; INTRAVENOUS EVERY 12 HOURS PRN
Status: DISCONTINUED | OUTPATIENT
Start: 2019-03-14 | End: 2019-03-16 | Stop reason: HOSPADM

## 2019-03-14 RX ORDER — NAPROXEN SODIUM 220 MG/1
81 TABLET, FILM COATED ORAL NIGHTLY
Status: DISCONTINUED | OUTPATIENT
Start: 2019-03-14 | End: 2019-03-16 | Stop reason: HOSPADM

## 2019-03-14 RX ORDER — SODIUM CHLORIDE 9 MG/ML
INJECTION, SOLUTION INTRAVENOUS CONTINUOUS
Status: ACTIVE | OUTPATIENT
Start: 2019-03-14 | End: 2019-03-14

## 2019-03-14 RX ORDER — CLINDAMYCIN PHOSPHATE 150 MG/ML
900 INJECTION, SOLUTION INTRAVENOUS
Status: DISCONTINUED | OUTPATIENT
Start: 2019-03-14 | End: 2019-03-14

## 2019-03-14 RX ORDER — PROTAMINE SULFATE 10 MG/ML
INJECTION, SOLUTION INTRAVENOUS
Status: DISCONTINUED | OUTPATIENT
Start: 2019-03-14 | End: 2019-03-14

## 2019-03-14 RX ORDER — PHENYLEPHRINE HYDROCHLORIDE 10 MG/ML
INJECTION INTRAVENOUS
Status: DISCONTINUED | OUTPATIENT
Start: 2019-03-14 | End: 2019-03-14

## 2019-03-14 RX ORDER — FENTANYL CITRATE 50 UG/ML
INJECTION, SOLUTION INTRAMUSCULAR; INTRAVENOUS
Status: DISCONTINUED | OUTPATIENT
Start: 2019-03-14 | End: 2019-03-14

## 2019-03-14 RX ORDER — HYDROMORPHONE HYDROCHLORIDE 1 MG/ML
0.2 INJECTION, SOLUTION INTRAMUSCULAR; INTRAVENOUS; SUBCUTANEOUS EVERY 5 MIN PRN
Status: DISCONTINUED | OUTPATIENT
Start: 2019-03-14 | End: 2019-03-14 | Stop reason: HOSPADM

## 2019-03-14 RX ORDER — RAMELTEON 8 MG/1
8 TABLET ORAL ONCE
Status: COMPLETED | OUTPATIENT
Start: 2019-03-14 | End: 2019-03-14

## 2019-03-14 RX ORDER — ACETAMINOPHEN 325 MG/1
650 TABLET ORAL EVERY 4 HOURS PRN
Status: DISCONTINUED | OUTPATIENT
Start: 2019-03-14 | End: 2019-03-16 | Stop reason: HOSPADM

## 2019-03-14 RX ORDER — DIPHENHYDRAMINE HCL 25 MG
50 CAPSULE ORAL ONCE
Status: COMPLETED | OUTPATIENT
Start: 2019-03-14 | End: 2019-03-14

## 2019-03-14 RX ORDER — DEXMEDETOMIDINE HYDROCHLORIDE 100 UG/ML
INJECTION, SOLUTION INTRAVENOUS
Status: DISCONTINUED | OUTPATIENT
Start: 2019-03-14 | End: 2019-03-14

## 2019-03-14 RX ORDER — HEPARIN SODIUM 1000 [USP'U]/ML
INJECTION, SOLUTION INTRAVENOUS; SUBCUTANEOUS
Status: DISCONTINUED | OUTPATIENT
Start: 2019-03-14 | End: 2019-03-14

## 2019-03-14 RX ORDER — LIDOCAINE HCL/PF 100 MG/5ML
SYRINGE (ML) INTRAVENOUS
Status: DISCONTINUED | OUTPATIENT
Start: 2019-03-14 | End: 2019-03-14

## 2019-03-14 RX ADMIN — ASPIRIN 81 MG CHEWABLE TABLET 81 MG: 81 TABLET CHEWABLE at 10:03

## 2019-03-14 RX ADMIN — NOREPINEPHRINE BITARTRATE 0.04 MCG/KG/MIN: 1 INJECTION, SOLUTION, CONCENTRATE INTRAVENOUS at 02:03

## 2019-03-14 RX ADMIN — FENTANYL CITRATE 100 MCG: 50 INJECTION INTRAMUSCULAR; INTRAVENOUS at 02:03

## 2019-03-14 RX ADMIN — Medication 200 MCG: at 01:03

## 2019-03-14 RX ADMIN — Medication 0.02 MCG/KG/MIN: at 03:03

## 2019-03-14 RX ADMIN — DEXMEDETOMIDINE HYDROCHLORIDE 35 MCG: 100 INJECTION, SOLUTION, CONCENTRATE INTRAVENOUS at 02:03

## 2019-03-14 RX ADMIN — HEPARIN SODIUM 4500 UNITS: 1000 INJECTION, SOLUTION INTRAVENOUS; SUBCUTANEOUS at 02:03

## 2019-03-14 RX ADMIN — LIDOCAINE HYDROCHLORIDE 100 MG: 20 INJECTION INTRAVENOUS at 02:03

## 2019-03-14 RX ADMIN — CLINDAMYCIN IN 5 PERCENT DEXTROSE 900 MG: 18 INJECTION, SOLUTION INTRAVENOUS at 02:03

## 2019-03-14 RX ADMIN — DIPHENHYDRAMINE HYDROCHLORIDE 50 MG: 25 CAPSULE ORAL at 09:03

## 2019-03-14 RX ADMIN — SODIUM CHLORIDE 500 ML: 0.9 INJECTION, SOLUTION INTRAVENOUS at 04:03

## 2019-03-14 RX ADMIN — DEXTROSE AND SODIUM CHLORIDE: 5; .45 INJECTION, SOLUTION INTRAVENOUS at 09:03

## 2019-03-14 RX ADMIN — SODIUM CHLORIDE: 0.9 INJECTION, SOLUTION INTRAVENOUS at 03:03

## 2019-03-14 RX ADMIN — RAMELTEON 8 MG: 8 TABLET, FILM COATED ORAL at 11:03

## 2019-03-14 RX ADMIN — SODIUM CHLORIDE, SODIUM GLUCONATE, SODIUM ACETATE, POTASSIUM CHLORIDE, MAGNESIUM CHLORIDE, SODIUM PHOSPHATE, DIBASIC, AND POTASSIUM PHOSPHATE: .53; .5; .37; .037; .03; .012; .00082 INJECTION, SOLUTION INTRAVENOUS at 01:03

## 2019-03-14 RX ADMIN — PROTAMINE SULFATE 50 MG: 10 INJECTION, SOLUTION INTRAVENOUS at 02:03

## 2019-03-14 RX ADMIN — DEXMEDETOMIDINE HYDROCHLORIDE 0.5 MCG/KG/HR: 100 INJECTION, SOLUTION, CONCENTRATE INTRAVENOUS at 01:03

## 2019-03-14 RX ADMIN — DEXMEDETOMIDINE HYDROCHLORIDE 77 MCG: 100 INJECTION, SOLUTION, CONCENTRATE INTRAVENOUS at 01:03

## 2019-03-14 RX ADMIN — CLINDAMYCIN IN 5 PERCENT DEXTROSE 900 MG: 18 INJECTION, SOLUTION INTRAVENOUS at 10:03

## 2019-03-14 RX ADMIN — HEPARIN SODIUM 8500 UNITS: 1000 INJECTION, SOLUTION INTRAVENOUS; SUBCUTANEOUS at 02:03

## 2019-03-14 RX ADMIN — PHENYLEPHRINE HYDROCHLORIDE 100 MCG: 10 INJECTION INTRAVENOUS at 02:03

## 2019-03-14 NOTE — ANESTHESIA PROCEDURE NOTES
Arterial    Diagnosis: Aortic Stenosis     Patient location during procedure: done in OR  Procedure start time: 3/14/2019 1:35 PM  Timeout: 3/14/2019 1:35 PM  Procedure end time: 3/14/2019 1:40 PM  Staffing  Anesthesiologist: Elmer Ochoa Jr., MD  Performed: anesthesiologist   Anesthesiologist was present at the time of the procedure.  Preanesthetic Checklist  Completed: patient identified, site marked, surgical consent, pre-op evaluation, timeout performed, IV checked, risks and benefits discussed, monitors and equipment checked and anesthesia consent givenArterial  Skin Prep: chlorhexidine gluconate  Local Infiltration: lidocaine  Orientation: right  Location: radial  Catheter Size: 20 G  Catheter placement by Anatomical landmarks. Heme positive aspiration all ports.Insertion Attempts: 1  Assessment  Dressing: secured with tape and tegaderm  Patient: Tolerated well

## 2019-03-14 NOTE — TRANSFER OF CARE
"Anesthesia Transfer of Care Note    Patient: Edmond J Favre    Procedure(s) Performed: Procedure(s) (LRB):  Replacement-valve-aortic (N/A)    Patient location: PACU    Anesthesia Type: general    Transport from OR: Transported from OR on 100% O2 by closed face mask with adequate spontaneous ventilation    Post pain: adequate analgesia    Post assessment: no apparent anesthetic complications and tolerated procedure well    Post vital signs: stable    Level of consciousness: awake, alert and oriented    Nausea/Vomiting: no nausea/vomiting    Complications: none          Last vitals:   Visit Vitals  BP (!) 131/59 (BP Location: Right arm, Patient Position: Lying)   Pulse 78   Temp 35.8 °C (96.4 °F) (Oral)   Resp 16   Ht 5' 8" (1.727 m)   Wt 77.1 kg (170 lb)   SpO2 96%   BMI 25.85 kg/m²     "

## 2019-03-14 NOTE — Clinical Note
Prepped: right groin and chest. Prepped with: ChloraPrep. The site was clipped. The patient was draped.

## 2019-03-14 NOTE — PLAN OF CARE
Vss. sats 97% on room air. rob groin perclose, guaze/trans film. 1500 hemostasis rob groins. Palpable pulses noted.  Pt has not voided yet, urinal at bedside.  No discomfort noted.  Pt on cm sb.  rij introducer with transvenous pacer in place. 40ppm, vvi. Standby mode.  25ma v, 2.0 v sensitivity.  12 lead ekg done per md order.  Pt tolerated 500ml bolus x1 per dr claudio. Able to wean levophed drip off at 1630. Vss. Ns at 150ml/hr x1 liter per md order to piv.  Pt's daughter and wife updated on new room, not at hospital.  Pt's belongings bag and clothes bag with pt and in new room 6094 at bedside.  See flowsheet for full assessment.  Pt noted with right forearm skin tear, dallin, healing intact.  Hob elevated to 30-45.  Dr claudio updated pt in ep pacu, verbalizes understanding.

## 2019-03-14 NOTE — PLAN OF CARE
Problem: Adult Inpatient Plan of Care  Goal: Plan of Care Review  Outcome: Ongoing (interventions implemented as appropriate)  Patient arrived to room. PIV placed, labs sent. Admit assessment completed. Plan of care discussed with patient. Will monitor. Report called to Evelyn

## 2019-03-14 NOTE — NURSING
Patient admitted to CMICU room 6094, AAOx4. Bilateral groin sites intact. Bed rest until 1900. Pulses DP +1. TVP to RIJ, back up rate at 40. 12 lead EKg on admit to unit. KRISSY.

## 2019-03-14 NOTE — PROGRESS NOTES
Dr adams ep fellow at bedside, updating pt and assessing transvenous pacer.  Currently on stand by mode. vvi 40ppm, 25 ma v output, 2.0 v sensitivity.  No changes done to pacer.  Dr claudio at bedside. Updating pt as well.  Dr claudio aware that levophed drip restarted upon arrival to ep pacu.  At .02mcg/kg/min iv per md order.  Per dr claudio, pt to receive 500ml ns bolus iv x1.  Infusing per md order ns bolus.  Pt's daughter in law updated by ep pacu rn. Will continue to update over phone.  Remains in ep pacu, awaiting icu bed.  Verbalizes understanding. Vss.  See flowsheet.  rob groin perclose guaze/trans film cdi, no hematoma or drainage.  Pt hob flat upon arrival. Dr claudio lifted hob to 30 degrees while at bedside.

## 2019-03-14 NOTE — PROGRESS NOTES
Pt's wife and daughter in law escorted back to ep pacu to visit pt for a few mins.  Vss. Remains in ep pacu, awaiting icu bed

## 2019-03-14 NOTE — OP NOTE
DATE OF PROCEDURE:  3/14/2019     PREOPERATIVE DIAGNOSIS:  Severe aortic stenosis     POSTOPERATIVE DIAGNOSIS:  Severe aortic stenosis.     PROCEDURE:  left transfemoral transcatheter aortic valve replacement with a 26   mm de dios s3 valve.     SURGEON:  Francisco Damon M.D.     CO-SURGEON:  ANTONIA gallagher     ANESTHESIA:  Moderate sedation and local anesthetic.     INDICATIONS FOR PROCEDURE:  A 89 yo  patient high risk for surgical AVR. The patient was evaluated for transcatheter valve by the valve team and found to be an acceptable transfemoral candidate. The patient understood the risks of the procedure and was able to give informed consent.     OPERATIVE FINDINGS:  Severe aortic stenosis, severely calcified valve,   well-seated prosthesis post-implant with no paravalvular leak.     BLOOD LOSS:  100 mL     PROCEDURE IN DETAIL:  The patient was taken electively to the Cath Lab, placed   supine upon the table.  Moderate sedation and local anesthetic was used. The patient was prepped and draped.  Both femoral arteries were accessed with a micropuncture technique.  We performed fluoroscopy, heparinized the patient, upsized to delivery sheaths. Catheters were positioned in the root of the aorta and we obtained our implant angle. We crossed the aortic valve with a soft wire and exchanged this for a stiff wire over the catheter.     We then performed balloon valvuloplasty under rapid pacing. The patient tolerated this well hemodynamically.     The valve was brought in the field sterilely.  We crossed the aortic arch with carotid occlusion, positioned at the aortic valve annulus and delivered it under rapid pacing. The valve was well seated with no paravalvular leak.  Cannulas were removed.  Groins were closed percutaneously.      Excellent hemostasis was achieved after administration of protamine. Dr gallagher and KELSIE performed the procedure together as cosurgeons and were present for the procedure.

## 2019-03-14 NOTE — NURSING TRANSFER
Nursing Transfer Note      3/14/2019     Transfer To: ep pacu 3 to 6041  Transfer via stretcher    Transfer with cardiac monitoring, portable cm, connected to icu monitor cm upon arrival     Transported by maria esther siddiqi    Medicines sent: ns mi    Chart send with patient: Yes    Notified: daughter    Patient reassessed at: 3/14/19 1715. Next due 1815    Upon arrival to floor: cardiac monitor applied, patient oriented to room, call bell in reach and bed in lowest position

## 2019-03-14 NOTE — Clinical Note
Angiography of the  left femoral artery performed to evaluate for the placement of a closure device.

## 2019-03-14 NOTE — Clinical Note
16 ml injected throughout the case. 134 mL total wasted during the case. 150 mL total used in the case.

## 2019-03-14 NOTE — Clinical Note
Prepped: groin and right neck. Prepped with: ChloraPrep. The site was clipped. The patient was draped.

## 2019-03-14 NOTE — ANESTHESIA PROCEDURE NOTES
[place] [facility]    Diagnosis: Aortic Stenosis   Patient location during procedure: done in OR  Procedure start time: 3/14/2019 1:45 PM  Timeout: 3/14/2019 1:45 PM  Procedure end time: 3/14/2019 2:10 PM  Staffing  Anesthesiologist: Elmer Ochoa Jr., MD  Resident/CRNA: Dewayne Zimmerman MD  Performed: resident/CRNA   Anesthesiologist was present at the time of the procedure.  Preanesthetic Checklist  Completed: patient identified, site marked, surgical consent, pre-op evaluation, timeout performed, IV checked, risks and benefits discussed, monitors and equipment checked and anesthesia consent given  [place]  Skin Prep: chlorhexidine gluconate  Local Infiltration: lidocaine  Location: right,  internal jugular vein  Vessel Caliber: large, patent, compressibility normal  Vascular Doppler:  not done  Coaxial introducer size: 6 Fr. manometry used.  Device: transvenous pacing catheter, fluoroscopy used.   Catheter Size: 5 Fr  Catheter placement by yes. Heme positive aspiration all ports. PAC floated with balloon up not wedgedSterile sheath usedInsertion Attempts: 1  Indication: intravenous therapy, hemodynamic monitoring, transvenous pacing  Ultrasound Guidance  Needle advanced into vessel with real time Ultrasound guidance.  Guidewire confirmed in vessel.  Sterile sheath used.  Assessment  Central Line Bundle Protocol followed. Hand hygiene before procedure, surgical cap worn, surgical mask worn, sterile surgical gloves worn, large sterile drape used.  Verification: chest x-ray, ultrasound and blood return  Dressing: secured with tape and tegaderm  Patient: Tolerated Well

## 2019-03-14 NOTE — PROGRESS NOTES
Pt's daughter in law and wife updated over phone with new room number 4055.  Pt's belongings bag and clothes with pt in ep pacu.  Pt's family went home, will be back in morning.  Will pass along to icu rn to call if need to get in touch with family.  Awaiting cmicu rn to call to give report. Vss.

## 2019-03-15 ENCOUNTER — ANESTHESIA (OUTPATIENT)
Dept: MEDSURG UNIT | Facility: HOSPITAL | Age: 84
DRG: 266 | End: 2019-03-15
Payer: MEDICARE

## 2019-03-15 ENCOUNTER — ANESTHESIA EVENT (OUTPATIENT)
Dept: MEDSURG UNIT | Facility: HOSPITAL | Age: 84
DRG: 266 | End: 2019-03-15
Payer: MEDICARE

## 2019-03-15 DIAGNOSIS — I35.0 NONRHEUMATIC AORTIC VALVE STENOSIS: Primary | ICD-10-CM

## 2019-03-15 PROBLEM — I44.7 LEFT BUNDLE BRANCH BLOCK: Status: ACTIVE | Noted: 2019-03-15

## 2019-03-15 PROBLEM — I44.7 LBBB (LEFT BUNDLE BRANCH BLOCK): Status: ACTIVE | Noted: 2019-03-15

## 2019-03-15 PROBLEM — I50.33 ACUTE ON CHRONIC DIASTOLIC HEART FAILURE: Status: ACTIVE | Noted: 2019-03-15

## 2019-03-15 PROBLEM — D62 POSTOPERATIVE ANEMIA DUE TO ACUTE BLOOD LOSS: Status: ACTIVE | Noted: 2019-03-15

## 2019-03-15 LAB
ANION GAP SERPL CALC-SCNC: 8 MMOL/L
BUN SERPL-MCNC: 16 MG/DL
CALCIUM SERPL-MCNC: 7.8 MG/DL
CHLORIDE SERPL-SCNC: 109 MMOL/L
CO2 SERPL-SCNC: 18 MMOL/L
CREAT SERPL-MCNC: 0.8 MG/DL
EST. GFR  (AFRICAN AMERICAN): >60 ML/MIN/1.73 M^2
EST. GFR  (NON AFRICAN AMERICAN): >60 ML/MIN/1.73 M^2
GLUCOSE SERPL-MCNC: 89 MG/DL
MAGNESIUM SERPL-MCNC: 1.9 MG/DL
POC ACTIVATED CLOTTING TIME K: 120 SEC (ref 74–137)
POC ACTIVATED CLOTTING TIME K: 120 SEC (ref 74–137)
POC ACTIVATED CLOTTING TIME K: 180 SEC (ref 74–137)
POTASSIUM SERPL-SCNC: 4.4 MMOL/L
SAMPLE: ABNORMAL
SAMPLE: NORMAL
SAMPLE: NORMAL
SODIUM SERPL-SCNC: 135 MMOL/L

## 2019-03-15 PROCEDURE — 99291 PR CRITICAL CARE, E/M 30-74 MINUTES: ICD-10-PCS | Mod: ,,, | Performed by: INTERNAL MEDICINE

## 2019-03-15 PROCEDURE — 93005 ELECTROCARDIOGRAM TRACING: CPT

## 2019-03-15 PROCEDURE — 83735 ASSAY OF MAGNESIUM: CPT

## 2019-03-15 PROCEDURE — 93619 COMPREHENSIVE EP EVALUATION: CPT

## 2019-03-15 PROCEDURE — 36415 COLL VENOUS BLD VENIPUNCTURE: CPT

## 2019-03-15 PROCEDURE — 25000003 PHARM REV CODE 250: Performed by: NURSE ANESTHETIST, CERTIFIED REGISTERED

## 2019-03-15 PROCEDURE — 93010 ELECTROCARDIOGRAM REPORT: CPT | Mod: 76,,, | Performed by: INTERNAL MEDICINE

## 2019-03-15 PROCEDURE — 63600175 PHARM REV CODE 636 W HCPCS: Performed by: STUDENT IN AN ORGANIZED HEALTH CARE EDUCATION/TRAINING PROGRAM

## 2019-03-15 PROCEDURE — 99231 SBSQ HOSP IP/OBS SF/LOW 25: CPT | Mod: GC,,, | Performed by: INTERNAL MEDICINE

## 2019-03-15 PROCEDURE — 25000003 PHARM REV CODE 250: Performed by: INTERNAL MEDICINE

## 2019-03-15 PROCEDURE — 37000008 HC ANESTHESIA 1ST 15 MINUTES: Performed by: INTERNAL MEDICINE

## 2019-03-15 PROCEDURE — 93010 ELECTROCARDIOGRAM REPORT: CPT | Mod: ,,, | Performed by: INTERNAL MEDICINE

## 2019-03-15 PROCEDURE — 80048 BASIC METABOLIC PNL TOTAL CA: CPT

## 2019-03-15 PROCEDURE — C1894 INTRO/SHEATH, NON-LASER: HCPCS | Performed by: INTERNAL MEDICINE

## 2019-03-15 PROCEDURE — C1785 PMKR, DUAL, RATE-RESP: HCPCS | Performed by: INTERNAL MEDICINE

## 2019-03-15 PROCEDURE — 63600175 PHARM REV CODE 636 W HCPCS: Performed by: NURSE ANESTHETIST, CERTIFIED REGISTERED

## 2019-03-15 PROCEDURE — 99291 CRITICAL CARE FIRST HOUR: CPT | Mod: ,,, | Performed by: INTERNAL MEDICINE

## 2019-03-15 PROCEDURE — 20000000 HC ICU ROOM

## 2019-03-15 PROCEDURE — D9220A PRA ANESTHESIA: Mod: ANES,,, | Performed by: ANESTHESIOLOGY

## 2019-03-15 PROCEDURE — S0020 INJECTION, BUPIVICAINE HYDRO: HCPCS | Performed by: INTERNAL MEDICINE

## 2019-03-15 PROCEDURE — 93010 EKG 12-LEAD: ICD-10-PCS | Mod: 76,,, | Performed by: INTERNAL MEDICINE

## 2019-03-15 PROCEDURE — 93619: ICD-10-PCS | Mod: 26,51,ICN, | Performed by: INTERNAL MEDICINE

## 2019-03-15 PROCEDURE — 99231 PR SUBSEQUENT HOSPITAL CARE,LEVL I: ICD-10-PCS | Mod: GC,,, | Performed by: INTERNAL MEDICINE

## 2019-03-15 PROCEDURE — S0077 INJECTION, CLINDAMYCIN PHOSP: HCPCS | Performed by: INTERNAL MEDICINE

## 2019-03-15 PROCEDURE — 33208 INSRT HEART PM ATRIAL & VENT: CPT | Performed by: INTERNAL MEDICINE

## 2019-03-15 PROCEDURE — D9220A PRA ANESTHESIA: ICD-10-PCS | Mod: CRNA,,, | Performed by: NURSE ANESTHETIST, CERTIFIED REGISTERED

## 2019-03-15 PROCEDURE — D9220A PRA ANESTHESIA: Mod: CRNA,,, | Performed by: NURSE ANESTHETIST, CERTIFIED REGISTERED

## 2019-03-15 PROCEDURE — 33208 PR INSER HART PACER XVENOUS ATR/VENTR: ICD-10-PCS | Mod: KX,ICN,, | Performed by: INTERNAL MEDICINE

## 2019-03-15 PROCEDURE — C1730 CATH, EP, 19 OR FEW ELECT: HCPCS | Performed by: INTERNAL MEDICINE

## 2019-03-15 PROCEDURE — 37000009 HC ANESTHESIA EA ADD 15 MINS: Performed by: INTERNAL MEDICINE

## 2019-03-15 PROCEDURE — 25500020 PHARM REV CODE 255: Performed by: NURSE ANESTHETIST, CERTIFIED REGISTERED

## 2019-03-15 PROCEDURE — 93619 COMPREHENSIVE EP EVALUATION: CPT | Mod: 26,51,ICN, | Performed by: INTERNAL MEDICINE

## 2019-03-15 PROCEDURE — C1898 LEAD, PMKR, OTHER THAN TRANS: HCPCS | Performed by: INTERNAL MEDICINE

## 2019-03-15 PROCEDURE — 33208 INSRT HEART PM ATRIAL & VENT: CPT | Mod: KX,ICN,, | Performed by: INTERNAL MEDICINE

## 2019-03-15 PROCEDURE — D9220A PRA ANESTHESIA: ICD-10-PCS | Mod: ANES,,, | Performed by: ANESTHESIOLOGY

## 2019-03-15 DEVICE — IPG W3DR01 AZURE S DR MRI WL USA
Type: IMPLANTABLE DEVICE | Site: CHEST | Status: FUNCTIONAL
Brand: AZURE™ S DR MRI SURESCAN™

## 2019-03-15 DEVICE — LEAD 5076-58 MRI US RCMCRD
Type: IMPLANTABLE DEVICE | Site: HEART | Status: FUNCTIONAL
Brand: CAPSUREFIX NOVUS MRI™ SURESCAN®

## 2019-03-15 DEVICE — LEAD 5076-52 MRI US RCMCRD
Type: IMPLANTABLE DEVICE | Site: HEART | Status: FUNCTIONAL
Brand: CAPSUREFIX NOVUS MRI™ SURESCAN®

## 2019-03-15 RX ORDER — VANCOMYCIN HCL IN 5 % DEXTROSE 1G/250ML
1000 PLASTIC BAG, INJECTION (ML) INTRAVENOUS
Status: CANCELLED | OUTPATIENT
Start: 2019-03-15

## 2019-03-15 RX ORDER — FUROSEMIDE 20 MG/1
TABLET ORAL
Qty: 30 TABLET | Refills: 5 | Status: ON HOLD | OUTPATIENT
Start: 2019-03-15 | End: 2024-03-25

## 2019-03-15 RX ORDER — FENTANYL CITRATE 50 UG/ML
INJECTION, SOLUTION INTRAMUSCULAR; INTRAVENOUS
Status: DISCONTINUED | OUTPATIENT
Start: 2019-03-15 | End: 2019-03-15

## 2019-03-15 RX ORDER — PROPOFOL 10 MG/ML
VIAL (ML) INTRAVENOUS CONTINUOUS PRN
Status: DISCONTINUED | OUTPATIENT
Start: 2019-03-15 | End: 2019-03-15

## 2019-03-15 RX ORDER — BUPIVACAINE HYDROCHLORIDE 5 MG/ML
INJECTION, SOLUTION EPIDURAL; INTRACAUDAL
Status: DISCONTINUED | OUTPATIENT
Start: 2019-03-15 | End: 2019-03-16 | Stop reason: HOSPADM

## 2019-03-15 RX ORDER — SODIUM CHLORIDE 9 MG/ML
INJECTION, SOLUTION INTRAVENOUS CONTINUOUS PRN
Status: DISCONTINUED | OUTPATIENT
Start: 2019-03-15 | End: 2019-03-15

## 2019-03-15 RX ORDER — FUROSEMIDE 10 MG/ML
20 INJECTION INTRAMUSCULAR; INTRAVENOUS ONCE
Status: DISCONTINUED | OUTPATIENT
Start: 2019-03-16 | End: 2019-03-15

## 2019-03-15 RX ORDER — SODIUM CHLORIDE 0.9 % (FLUSH) 0.9 %
3 SYRINGE (ML) INJECTION
Status: DISCONTINUED | OUTPATIENT
Start: 2019-03-15 | End: 2019-03-16 | Stop reason: HOSPADM

## 2019-03-15 RX ORDER — PHENYLEPHRINE HYDROCHLORIDE 10 MG/ML
INJECTION INTRAVENOUS
Status: DISCONTINUED | OUTPATIENT
Start: 2019-03-15 | End: 2019-03-15

## 2019-03-15 RX ORDER — IODIXANOL 320 MG/ML
INJECTION, SOLUTION INTRAVASCULAR
Status: DISCONTINUED | OUTPATIENT
Start: 2019-03-15 | End: 2019-03-15

## 2019-03-15 RX ORDER — FUROSEMIDE 10 MG/ML
20 INJECTION INTRAMUSCULAR; INTRAVENOUS ONCE
Status: COMPLETED | OUTPATIENT
Start: 2019-03-16 | End: 2019-03-16

## 2019-03-15 RX ORDER — LIDOCAINE HYDROCHLORIDE 20 MG/ML
INJECTION, SOLUTION INFILTRATION; PERINEURAL
Status: DISCONTINUED | OUTPATIENT
Start: 2019-03-15 | End: 2019-03-16 | Stop reason: HOSPADM

## 2019-03-15 RX ORDER — LIDOCAINE HCL/PF 100 MG/5ML
SYRINGE (ML) INTRAVENOUS
Status: DISCONTINUED | OUTPATIENT
Start: 2019-03-15 | End: 2019-03-15

## 2019-03-15 RX ORDER — LIDOCAINE HYDROCHLORIDE 20 MG/ML
INJECTION, SOLUTION EPIDURAL; INFILTRATION; INTRACAUDAL; PERINEURAL
Status: DISCONTINUED | OUTPATIENT
Start: 2019-03-15 | End: 2019-03-16 | Stop reason: HOSPADM

## 2019-03-15 RX ORDER — VANCOMYCIN HCL IN 5 % DEXTROSE 1G/250ML
1000 PLASTIC BAG, INJECTION (ML) INTRAVENOUS ONCE
Status: COMPLETED | OUTPATIENT
Start: 2019-03-15 | End: 2019-03-15

## 2019-03-15 RX ORDER — ACETAMINOPHEN 325 MG/1
650 TABLET ORAL EVERY 4 HOURS PRN
Status: DISCONTINUED | OUTPATIENT
Start: 2019-03-15 | End: 2019-03-16 | Stop reason: HOSPADM

## 2019-03-15 RX ORDER — RAMELTEON 8 MG/1
8 TABLET ORAL ONCE
Status: COMPLETED | OUTPATIENT
Start: 2019-03-15 | End: 2019-03-15

## 2019-03-15 RX ADMIN — ASPIRIN 81 MG CHEWABLE TABLET 81 MG: 81 TABLET CHEWABLE at 09:03

## 2019-03-15 RX ADMIN — FENTANYL CITRATE 50 MCG: 50 INJECTION, SOLUTION INTRAMUSCULAR; INTRAVENOUS at 02:03

## 2019-03-15 RX ADMIN — CLINDAMYCIN IN 5 PERCENT DEXTROSE 900 MG: 18 INJECTION, SOLUTION INTRAVENOUS at 06:03

## 2019-03-15 RX ADMIN — ACETAMINOPHEN 650 MG: 325 TABLET, FILM COATED ORAL at 06:03

## 2019-03-15 RX ADMIN — PHENYLEPHRINE HYDROCHLORIDE 200 MCG: 10 INJECTION INTRAVENOUS at 03:03

## 2019-03-15 RX ADMIN — CLOPIDOGREL 75 MG: 75 TABLET, FILM COATED ORAL at 08:03

## 2019-03-15 RX ADMIN — PROPOFOL 50 MCG/KG/MIN: 10 INJECTION, EMULSION INTRAVENOUS at 02:03

## 2019-03-15 RX ADMIN — RAMELTEON 8 MG: 8 TABLET, FILM COATED ORAL at 09:03

## 2019-03-15 RX ADMIN — SODIUM CHLORIDE: 0.9 INJECTION, SOLUTION INTRAVENOUS at 02:03

## 2019-03-15 RX ADMIN — FENTANYL CITRATE 25 MCG: 50 INJECTION, SOLUTION INTRAMUSCULAR; INTRAVENOUS at 04:03

## 2019-03-15 RX ADMIN — FENTANYL CITRATE 25 MCG: 50 INJECTION, SOLUTION INTRAMUSCULAR; INTRAVENOUS at 03:03

## 2019-03-15 RX ADMIN — PHENYLEPHRINE HYDROCHLORIDE 100 MCG: 10 INJECTION INTRAVENOUS at 02:03

## 2019-03-15 RX ADMIN — LIDOCAINE HYDROCHLORIDE 100 MG: 20 INJECTION, SOLUTION INTRAVENOUS at 02:03

## 2019-03-15 RX ADMIN — IODIXANOL 100 ML: 320 INJECTION, SOLUTION INTRAVASCULAR at 04:03

## 2019-03-15 RX ADMIN — FLUOXETINE 20 MG: 20 CAPSULE ORAL at 08:03

## 2019-03-15 RX ADMIN — SODIUM CHLORIDE 0.25 MCG/KG/MIN: 9 INJECTION, SOLUTION INTRAVENOUS at 03:03

## 2019-03-15 RX ADMIN — PROPOFOL: 10 INJECTION, EMULSION INTRAVENOUS at 04:03

## 2019-03-15 RX ADMIN — Medication 1000 MG: at 03:03

## 2019-03-15 RX ADMIN — PROPOFOL: 10 INJECTION, EMULSION INTRAVENOUS at 03:03

## 2019-03-15 NOTE — SUBJECTIVE & OBJECTIVE
Past Medical History:   Diagnosis Date    Anxiety     Aortic stenosis, severe     Arthritis     Carotid artery stenosis     bilateral    Depression     Diverticulitis     Hyperlipidemia        Past Surgical History:   Procedure Laterality Date    ANGIOGRAM, CORONARY ARTERY N/A 2019    Performed by Edd Pickett MD at [facility] CATH LAB    COLECTOMY         Review of patient's allergies indicates:   Allergen Reactions    Pcn [penicillins]        No current facility-administered medications on file prior to encounter.      Current Outpatient Medications on File Prior to Encounter   Medication Sig    aspirin 81 MG Chew Take 81 mg by mouth every evening.     clopidogrel (PLAVIX) 75 mg tablet Take 1 tablet (75 mg total) by mouth once daily.    doxazosin (CARDURA) 4 MG tablet Take 4 mg by mouth every evening.    FLUoxetine 20 MG capsule Take 20 mg by mouth once daily.    hydrOXYzine (ATARAX) 50 MG tablet Take 50 mg by mouth 2 (two) times daily.    lisinopril (PRINIVIL,ZESTRIL) 5 MG tablet Take 5 mg by mouth once daily.    multivitamin capsule Take 1 capsule by mouth once daily.    simvastatin (ZOCOR) 40 MG tablet Take 40 mg by mouth every evening.    clotrimazole (LOTRIMIN) 1 % Soln Apply topically 2 (two) times daily.     Family History     None        Tobacco Use    Smoking status: Former Smoker     Types: Cigarettes     Last attempt to quit: 1985     Years since quittin.2    Smokeless tobacco: Never Used   Substance and Sexual Activity    Alcohol use: Yes     Comment: glass of wine with dinner    Drug use: No    Sexual activity: No     Review of Systems   Unable to perform ROS: mental status change     Objective:     Vital Signs (Most Recent):  Temp: 97.4 °F (36.3 °C) (19)  Pulse: 65 (19)  Resp: (!) 31 (19)  BP: 125/61 (19)  SpO2: 95 % (19) Vital Signs (24h Range):  Temp:  [96.4 °F (35.8 °C)-97.8 °F (36.6 °C)] 97.4 °F (36.3  °C)  Pulse:  [46-78] 65  Resp:  [12-31] 31  SpO2:  [91 %-100 %] 95 %  BP: ()/(53-62) 125/61  Arterial Line BP: ()/(36-50) 138/50       Weight: 78.4 kg (172 lb 13.5 oz)  Body mass index is 26.28 kg/m².    SpO2: 95 %  O2 Device (Oxygen Therapy): room air    Physical Exam   Constitutional: He is oriented to person, place, and time. He appears well-developed and well-nourished.   HENT:   Head: Normocephalic and atraumatic.   Nose: Nose normal.   Mouth/Throat: No oropharyngeal exudate.   Eyes: Right eye exhibits no discharge. Left eye exhibits no discharge. No scleral icterus.   Neck: Normal range of motion. Neck supple. No JVD present.   Cardiovascular: Normal rate, regular rhythm, S1 normal and S2 normal. Exam reveals no gallop, no S3, no S4, no distant heart sounds, no friction rub, no midsystolic click and no opening snap.   No murmur heard.  Pulses:       Radial pulses are 2+ on the right side, and 2+ on the left side.        Femoral pulses are 2+ on the right side, and 2+ on the left side.  TVP in place   Pulmonary/Chest: Effort normal and breath sounds normal. No respiratory distress. He has no wheezes. He has no rales. He exhibits no tenderness.   Abdominal: Soft. Bowel sounds are normal. He exhibits no distension. There is no tenderness. There is no rebound.   Musculoskeletal: Normal range of motion. He exhibits no edema, tenderness or deformity.   Lymphadenopathy:     He has no cervical adenopathy.   Neurological: He is alert and oriented to person, place, and time. No cranial nerve deficit.   Skin: Skin is warm and dry.   Psychiatric: He has a normal mood and affect. His behavior is normal.       Significant Labs: All pertinent lab results from the last 24 hours have been reviewed.    Significant Imaging: All pertinent images from the last 24h have been reviewed.

## 2019-03-15 NOTE — DISCHARGE INSTRUCTIONS
Weigh yourself on the same scale every morning. If you gain more than 3 lbs in 1 day or more than 5 lbs in 1 week, or if you experience worsening shortness of breath, swelling in your feet or legs, or difficulty laying flat, take a Lasix pill until you reach your normal weight or your symptoms have resolved. If you have any questions, you can call our office at [phone].     You will need to take a single dose of antibiotics prior to certain dental procedures, colonoscopies, or bladder procedures. We can call this prescription in for you or the physician performing the procedure can call it in for you. If you have questions about whether or not a procedure will require antibiotics, you can call our office. Always let your physician know that you have an artificial heart valve.

## 2019-03-15 NOTE — ASSESSMENT & PLAN NOTE
87 y.o. male who underwent TAVR today for severe AS (NYHA Class III sx).     He had angiogram that showed non obstructive CAD.     LVEF is 65%    He tolerated the procedure well but developed new LBBB.       Telemetry and EKG show persistence of LBBB    Baseline EKG:  NSR 89 BPM  DC interval 240 ms  QRS duration 110 ms  QT/QTc 366/445 ms     Post-Procedure EKG:  Sinus courtney 52 BPM  DC interval 338 ms  QRS duration 156 ms, LBBB  QT/QTc 552/513 m    Post-Procedure EKG (day 1):  Sinus 78 BPM  DC interval 284 ms  QRS duration 144 ms, LBBB  QT/QTc 446/508 ms    Recommendations:  -EPS +/- DCh-PPM implantation today w Dr Chamberlain    Discussed w Dr Crow

## 2019-03-15 NOTE — CONSULTS
[facility]  Cardiac Electrophysiology  Consult Note    Admission Date: 3/14/2019  Code Status: No Order   Attending Provider: Ventura Whiteside MD  Consulting Provider: Greg Carter MD  Principal Problem:<principal problem not specified>    Inpatient consult to Electrophysiology  Consult performed by: Greg Carter MD  Consult ordered by: Zan Obrien MD        Subjective:     Chief Complaint:  Post TAVR care    HPI:   Reason for Consult: Post TAVR    HPI:  87 y.o. male  who underwent TAVR today for severe AS (NYHA Class III sx).     Patient has been follewd by Bam Pickett for several years and known to have AS. 4.5 months ago he started to experience dyspnea on exertion. He gets short of breath doing regular household chores. He had angiogram that showed non obstructive CAD.      The patient has undergone the following TAVR work-up:   · ECHO (Date 2.20.19): SUDHAKAR= 0.9 cm2, MG= 50 mmHg, Peak Cyrus= 4.4 m/s, EF= 65%.   · LHC (Date 1.23.2019): Non obstructive CAD   · STS: 6%   · Frailty: 1/4   · Iliacs are >9.5 on R and > 9.5 on L   · LVOT area by CTA is 4.44 cm2 (27 mm X 21 mm) and Avg Diameter is 23.8 per Dr Crowe  · Incidental findings on CT: Asbestosis  · CT Surgery risk assessment: high risk, per Dr Damon due to Age  · Rhythm issues: NSR  · PFTs: FEV1 77% predicted, DLCO 58% predicted.  · Comorbidities: Hypertension, shoulder arthritis    He tolerated the procedure well but developed new LBBB. He was sleepy from anesthesia by the time of our encounter but denied chest pain, dyspnea, or lightheadedness.    Baseline EKG:  NSR 89 BPM  RI interval 240 ms  QRS duration 110 ms  QT/QTc 366/445 ms    Post-Procedure EKG:  Sinus courtney 52 BPM  RI interval 338 ms  QRS duration 156 ms  QT/QTc 552/513 m    Past Medical History:   Diagnosis Date    Anxiety     Aortic stenosis, severe     Arthritis     Carotid artery stenosis     bilateral    Depression     Diverticulitis      Hyperlipidemia        Past Surgical History:   Procedure Laterality Date    ANGIOGRAM, CORONARY ARTERY N/A 2019    Performed by Edd Pickett MD at [facility] CATH LAB    COLECTOMY         Review of patient's allergies indicates:   Allergen Reactions    Pcn [penicillins]        No current facility-administered medications on file prior to encounter.      Current Outpatient Medications on File Prior to Encounter   Medication Sig    aspirin 81 MG Chew Take 81 mg by mouth every evening.     clopidogrel (PLAVIX) 75 mg tablet Take 1 tablet (75 mg total) by mouth once daily.    doxazosin (CARDURA) 4 MG tablet Take 4 mg by mouth every evening.    FLUoxetine 20 MG capsule Take 20 mg by mouth once daily.    hydrOXYzine (ATARAX) 50 MG tablet Take 50 mg by mouth 2 (two) times daily.    lisinopril (PRINIVIL,ZESTRIL) 5 MG tablet Take 5 mg by mouth once daily.    multivitamin capsule Take 1 capsule by mouth once daily.    simvastatin (ZOCOR) 40 MG tablet Take 40 mg by mouth every evening.    clotrimazole (LOTRIMIN) 1 % Soln Apply topically 2 (two) times daily.     Family History     None        Tobacco Use    Smoking status: Former Smoker     Types: Cigarettes     Last attempt to quit: 1985     Years since quittin.2    Smokeless tobacco: Never Used   Substance and Sexual Activity    Alcohol use: Yes     Comment: glass of wine with dinner    Drug use: No    Sexual activity: No     Review of Systems   Unable to perform ROS: mental status change     Objective:     Vital Signs (Most Recent):  Temp: 97.4 °F (36.3 °C) (19)  Pulse: 65 (19)  Resp: (!) 31 (19)  BP: 125/61 (19)  SpO2: 95 % (19) Vital Signs (24h Range):  Temp:  [96.4 °F (35.8 °C)-97.8 °F (36.6 °C)] 97.4 °F (36.3 °C)  Pulse:  [46-78] 65  Resp:  [12-31] 31  SpO2:  [91 %-100 %] 95 %  BP: ()/(53-62) 125/61  Arterial Line BP: ()/(36-50) 138/50       Weight: 78.4 kg (172 lb 13.5  oz)  Body mass index is 26.28 kg/m².    SpO2: 95 %  O2 Device (Oxygen Therapy): room air    Physical Exam   Constitutional: He is oriented to person, place, and time. He appears well-developed and well-nourished.   HENT:   Head: Normocephalic and atraumatic.   Nose: Nose normal.   Mouth/Throat: No oropharyngeal exudate.   Eyes: Right eye exhibits no discharge. Left eye exhibits no discharge. No scleral icterus.   Neck: Normal range of motion. Neck supple. No JVD present.   Cardiovascular: Normal rate, regular rhythm, S1 normal and S2 normal. Exam reveals no gallop, no S3, no S4, no distant heart sounds, no friction rub, no midsystolic click and no opening snap.   No murmur heard.  Pulses:       Radial pulses are 2+ on the right side, and 2+ on the left side.        Femoral pulses are 2+ on the right side, and 2+ on the left side.  TVP in place   Pulmonary/Chest: Effort normal and breath sounds normal. No respiratory distress. He has no wheezes. He has no rales. He exhibits no tenderness.   Abdominal: Soft. Bowel sounds are normal. He exhibits no distension. There is no tenderness. There is no rebound.   Musculoskeletal: Normal range of motion. He exhibits no edema, tenderness or deformity.   Lymphadenopathy:     He has no cervical adenopathy.   Neurological: He is alert and oriented to person, place, and time. No cranial nerve deficit.   Skin: Skin is warm and dry.   Psychiatric: He has a normal mood and affect. His behavior is normal.       Significant Labs: All pertinent lab results from the last 24 hours have been reviewed.    Significant Imaging: All pertinent images from the last 24h have been reviewed.                Assessment and Plan:     Nodular calcific aortic valve stenosis    87 y.o. male who underwent TAVR today for severe AS (NYHA Class III sx).     He had angiogram that showed non obstructive CAD.     LVEF is 65%    He tolerated the procedure well but developed new LBBB.     Baseline EKG:  NSR 89  BPM  AK interval 240 ms  QRS duration 110 ms  QT/QTc 366/445 ms    Post-Procedure EKG:  Sinus courtney 52 BPM  AK interval 338 ms  QRS duration 156 ms  QT/QTc 552/513 m    TVP: VVI, base rate of 40, threshold <1    Recommendations:  -NPO after midnight  -Avoid heparin products  -Avoid AV tyrell blockers  -Keep TVP in place  -Will perform EPS in the AM    Discussed w Dr Crow         Thank you for your consult. I will follow-up with patient. Please contact us if you have any additional questions.    Greg Ch MD  Cardiac Electrophysiology  [facility]

## 2019-03-15 NOTE — BRIEF OP NOTE
Patient is s/p medtronic  dual chamber PPM :   Tolerated procedure well. No acute complication noted.  Post op care per protocol.  Will monitor in recovery on tele overnight  vanc 1 gram q12 hours x 2 doses (ordered)  NO HEPARIN PRODUCTS  Doxycycline 100 mg BID for 4 days at discharge  Dressing will be removed in AM by EP  Chest Xray (ordered)    Other instruction:   ==============================  Sling to left arm - wear for 48 hours, then only at night for 6 weeks.  No lifting left elbow above shoulder height  No lifting over 5 pounds  No driving for 1 week and for 4 weeks if patient uses left arm to make turns  Do not let beam of shower/water hit site directly and no scrubbing in area  Follow up in device clinic in 1 week and with Ep clinic  in 3 months.  Notify Cardiology/EP increased redness, warmth, drainage, or re-opening of the wound   Please call [phone] option 1 during business hours or the main [facility] number and ask for on-call for device clinic after hours.

## 2019-03-15 NOTE — HOSPITAL COURSE
Mr. Favre was admitted and underwent successful placement of a 26 mm Jermaine S3 TAVR via TF access. He developed a new LBBB post-procedure. TVP was left in place and EP was consulted. Otherwise, there were no complications and he was taken to the CCU in stable condition. He remained stable overnight. The following morning, his LBBB persisted and he was taken for EP study. This revealed abnormal HV intervals and PPM was placed. He returned to the CCU in stable condition. He remained stable overnight. The following morning, he required diuresis for acute on chronic diastolic heart failure. That afternoon, he ambulated in the cordova without difficulty. He was eager to go home. It was felt he was stable for discharge.

## 2019-03-15 NOTE — HPI
Edmond J Favre is a 87 y.o. male referred by Dr Pickett for evaluation of severe AS (NYHA Class III sx).     Patient has been follewd by Bam Pickett for several years and known to have AS. 4.5 months ago he started toe experience dyspnea on exertion. He gets short of breath doing regular household chores. He had angiogram that showed non obstructive CAD. Outside echo showed severe AS. He was referred for valve replacement options.      The patient has undergone the following TAVR work-up:   · ECHO (Date 2.20.19): SUDHAKAR= 0.9 cm2, MG= 50 mmHg, Peak Cyrus= 4.4 m/s, EF= 65%.   · LHC (Date 1.23.2019): Non obstructive CAD   · STS: 6%   · Frailty: 1/4   · Iliacs are >9.5 on R and > 9.5 on L   · LVOT area by CTA is 4.44 cm2 (27 mm X 21 mm) and Avg Diameter is 23.8 per Dr Crowe  · Incidental findings on CT: Asbestosis  · CT Surgery risk assessment: high risk, per Dr Damon due to Age  · Rhythm issues: NSR  · PFTs: FEV1 77% predicted, DLCO 58% predicted.  · Comorbidities: Hypertension, shoulder arthritis        Edmond J Favre is a 26 mm  Jermaine S3 valve candidate via Left TF access (18% oversized). (High bifurcation on RCFA)

## 2019-03-15 NOTE — PROGRESS NOTES
[facility]  Interventional Cardiology  Progress Note    Patient Name: Edmond J Favre  MRN: [id number]  Admission Date: 3/14/2019  Hospital Length of Stay: 1 days  Code Status: Full Code   Attending Physician: Cristian Zambrano MD   Primary Care Physician: Primary Doctor No  Principal Problem:Nodular calcific aortic valve stenosis    Subjective:     Interval History: No events overnight. No complaints this AM. EPS today.     Objective:     Vital Signs (Most Recent):  Temp: (pt in ep) (03/15/19 1600)  Pulse: 72 (03/15/19 1300)  Resp: (!) 21 (03/15/19 1300)  BP: 126/60 (03/15/19 1000)  SpO2: 98 % (03/15/19 1300) Vital Signs (24h Range):  Temp:  [97.3 °F (36.3 °C)-98.6 °F (37 °C)] 98.3 °F (36.8 °C)  Pulse:  [47-80] 72  Resp:  [10-31] 21  SpO2:  [94 %-100 %] 98 %  BP: (103-144)/(51-64) 126/60  Arterial Line BP: (115-166)/(41-53) 166/50     Weight: 78.4 kg (172 lb 13.5 oz)  Body mass index is 26.28 kg/m².    SpO2: 98 %  O2 Device (Oxygen Therapy): room air      Intake/Output Summary (Last 24 hours) at 3/15/2019 1654  Last data filed at 3/15/2019 1646  Gross per 24 hour   Intake 1950 ml   Output 710 ml   Net 1240 ml       Lines/Drains/Airways     Central Venous Catheter Line                 Introducer 03/14/19 1407 right internal jugular 1 day          Arterial Line                 Arterial Line 03/14/19 1335 Right Radial 1 day          Line                 Pacer Wires 03/14/19 1345 1 day          Peripheral Intravenous Line                 Peripheral IV - Single Lumen 03/14/19 0947 Left Forearm 1 day         Peripheral IV - Single Lumen 03/15/19 1040 Anterior;Right Upper Arm less than 1 day                Physical Exam   Constitutional: He is oriented to person, place, and time. He appears well-developed and well-nourished.   HENT:   Head: Normocephalic and atraumatic.   Eyes: EOM are normal. Pupils are equal, round, and reactive to light.   Neck: Neck supple. JVD present. No tracheal deviation  present. No thyromegaly present.   Cardiovascular: Normal rate, regular rhythm, S1 normal, S2 normal, intact distal pulses and normal pulses. PMI is not displaced. Exam reveals no gallop and no friction rub.   No murmur heard.  Pulmonary/Chest: Effort normal. No respiratory distress. He has decreased breath sounds in the right lower field and the left lower field. He has no wheezes. He has no rales. He exhibits no tenderness.   Abdominal: Soft. Bowel sounds are normal. He exhibits no distension and no mass. There is no tenderness.   Musculoskeletal: Normal range of motion. He exhibits no edema or tenderness.   Neurological: He is alert and oriented to person, place, and time.   Skin: Skin is warm and dry. No rash noted.   Psychiatric: He has a normal mood and affect. His behavior is normal.       Significant Labs:   BMP:   Recent Labs   Lab 03/14/19  0917 03/15/19  0324    89    135*   K 4.1 4.4    109   CO2 25 18*   BUN 17 16   CREATININE 1.0 0.8   CALCIUM 9.0 7.8*   MG  --  1.9    and CBC   Recent Labs   Lab 03/14/19  0917 03/14/19  2311   WBC 6.49 8.51   HGB 9.6* 8.4*   HCT 29.1* 25.8*    191         Assessment and Plan:     Patient is a 88 y.o. male presenting with:    Postoperative anemia due to acute blood loss    Expected post-TAVR.   Asymptomatic.   No indication for transfusion at this time.      LBBB (left bundle branch block)    New post-TAVR.   Plan for EPS +/- PPM today.      Acute on chronic diastolic heart failure    Fluid balance + with evidence of volume overload on physical exam.   CVP elevated during case yesterday.    Will diurese with IV lasix and continue PO lasix at discharge.      Nodular calcific aortic valve stenosis    S/p successful aortic valve replacement with 26 mm (-1.5 mL) Jermaine S3 valve performed via left transfemoral access.  The post TAVR 2D echo showed no perivalvular leak. The AV per 2d-echo mean gradient is 1.7 mmHg the peak velocity is 0.9 m/s.  On  ASA and Plavix.      Essential hypertension    Controlled on current regimen.        Lasix IV for diuresis.   OOB to chair.   IS at least 10x/hr.  EPS +/- PPM today.       VTE Risk Mitigation (From admission, onward)    None          Milly Shaikh PA-C  Interventional Cardiology  [facility]  [id number]

## 2019-03-15 NOTE — ASSESSMENT & PLAN NOTE
Fluid balance + with evidence of volume overload on physical exam.   CVP elevated during case yesterday.    Will diurese with IV lasix and continue PO lasix at discharge.

## 2019-03-15 NOTE — PLAN OF CARE
Problem: Adult Inpatient Plan of Care  Goal: Plan of Care Review  Outcome: Ongoing (interventions implemented as appropriate)   Np acute changes throughout shift. Patient went to EP this afternoon and had a permenant pacemaker placed to left chest without complications.

## 2019-03-15 NOTE — PROGRESS NOTES
[facility]  Cardiac Electrophysiology  Progress Note    Admission Date: 3/14/2019  Code Status: No Order   Attending Physician: Cristian Zambrano MD   Expected Discharge Date:   Principal Problem:<principal problem not specified>    Subjective:     Interval History: Feeling well today, although complains of not sleeping well for the last two nights.     Telemetry and EKG show persistence of LBBB    Baseline EKG:  NSR 89 BPM  WI interval 240 ms  QRS duration 110 ms  QT/QTc 366/445 ms     Post-Procedure EKG:  Sinus courtney 52 BPM  WI interval 338 ms  QRS duration 156 ms, LBBB  QT/QTc 552/513 m    Post-Procedure EKG (day 1):  Sinus 78 BPM  WI interval 284 ms  QRS duration 144 ms, LBBB  QT/QTc 446/508 ms    ROS  Objective:     Vital Signs (Most Recent):  Temp: 98.3 °F (36.8 °C) (03/15/19 1100)  Pulse: 72 (03/15/19 1300)  Resp: (!) 21 (03/15/19 1300)  BP: 126/60 (03/15/19 1000)  SpO2: 98 % (03/15/19 1300) Vital Signs (24h Range):  Temp:  [97.3 °F (36.3 °C)-98.6 °F (37 °C)] 98.3 °F (36.8 °C)  Pulse:  [46-80] 72  Resp:  [10-31] 21  SpO2:  [91 %-100 %] 98 %  BP: ()/(51-64) 126/60  Arterial Line BP: ()/(36-53) 166/50     Weight: 78.4 kg (172 lb 13.5 oz)  Body mass index is 26.28 kg/m².     SpO2: 98 %  O2 Device (Oxygen Therapy): room air    Physical Exam   Constitutional: He is oriented to person, place, and time. He appears well-developed and well-nourished.   HENT:   Head: Normocephalic and atraumatic.   Nose: Nose normal.   Mouth/Throat: No oropharyngeal exudate.   Eyes: Right eye exhibits no discharge. Left eye exhibits no discharge. No scleral icterus.   Neck: Normal range of motion. Neck supple. No JVD present.   Cardiovascular: Normal rate, regular rhythm, S1 normal and S2 normal. Exam reveals no gallop, no S3, no S4, no distant heart sounds, no friction rub, no midsystolic click and no opening snap.   No murmur heard.  Pulses:       Radial pulses are 2+ on the right side, and 2+ on the  left side.        Femoral pulses are 2+ on the right side, and 2+ on the left side.  TVP in place   Pulmonary/Chest: Effort normal and breath sounds normal. No respiratory distress. He has no wheezes. He has no rales. He exhibits no tenderness.   Abdominal: Soft. Bowel sounds are normal. He exhibits no distension. There is no tenderness. There is no rebound.   Musculoskeletal: Normal range of motion. He exhibits no edema, tenderness or deformity.   Lymphadenopathy:     He has no cervical adenopathy.   Neurological: He is alert and oriented to person, place, and time. No cranial nerve deficit.   Skin: Skin is warm and dry.   Psychiatric: He has a normal mood and affect. His behavior is normal.       Significant Labs: All pertinent lab results from the last 24 hours have been reviewed.    Significant Imaging: All pertinent images from the last 24h have been reviewed.      Assessment and Plan:     Nodular calcific aortic valve stenosis    87 y.o. male who underwent TAVR today for severe AS (NYHA Class III sx).     He had angiogram that showed non obstructive CAD.     LVEF is 65%    He tolerated the procedure well but developed new LBBB.       Telemetry and EKG show persistence of LBBB    Baseline EKG:  NSR 89 BPM  NE interval 240 ms  QRS duration 110 ms  QT/QTc 366/445 ms     Post-Procedure EKG:  Sinus courtney 52 BPM  NE interval 338 ms  QRS duration 156 ms, LBBB  QT/QTc 552/513 m    Post-Procedure EKG (day 1):  Sinus 78 BPM  NE interval 284 ms  QRS duration 144 ms, LBBB  QT/QTc 446/508 ms    Recommendations:  -EPS +/- DCh-PPM implantation today w Dr Chamberlain    Discussed w Dr Cintia Ch MD  Cardiac Electrophysiology  [facility]

## 2019-03-15 NOTE — SUBJECTIVE & OBJECTIVE
Interval History: No events overnight. No complaints this AM. EPS today.     Objective:     Vital Signs (Most Recent):  Temp: (pt in ep) (03/15/19 1600)  Pulse: 72 (03/15/19 1300)  Resp: (!) 21 (03/15/19 1300)  BP: 126/60 (03/15/19 1000)  SpO2: 98 % (03/15/19 1300) Vital Signs (24h Range):  Temp:  [97.3 °F (36.3 °C)-98.6 °F (37 °C)] 98.3 °F (36.8 °C)  Pulse:  [47-80] 72  Resp:  [10-31] 21  SpO2:  [94 %-100 %] 98 %  BP: (103-144)/(51-64) 126/60  Arterial Line BP: (115-166)/(41-53) 166/50     Weight: 78.4 kg (172 lb 13.5 oz)  Body mass index is 26.28 kg/m².    SpO2: 98 %  O2 Device (Oxygen Therapy): room air      Intake/Output Summary (Last 24 hours) at 3/15/2019 1654  Last data filed at 3/15/2019 1646  Gross per 24 hour   Intake 1950 ml   Output 710 ml   Net 1240 ml       Lines/Drains/Airways     Central Venous Catheter Line                 Introducer 03/14/19 1407 right internal jugular 1 day          Arterial Line                 Arterial Line 03/14/19 1335 Right Radial 1 day          Line                 Pacer Wires 03/14/19 1345 1 day          Peripheral Intravenous Line                 Peripheral IV - Single Lumen 03/14/19 0947 Left Forearm 1 day         Peripheral IV - Single Lumen 03/15/19 1040 Anterior;Right Upper Arm less than 1 day                Physical Exam   Constitutional: He is oriented to person, place, and time. He appears well-developed and well-nourished.   HENT:   Head: Normocephalic and atraumatic.   Eyes: EOM are normal. Pupils are equal, round, and reactive to light.   Neck: Neck supple. JVD present. No tracheal deviation present. No thyromegaly present.   Cardiovascular: Normal rate, regular rhythm, S1 normal, S2 normal, intact distal pulses and normal pulses. PMI is not displaced. Exam reveals no gallop and no friction rub.   No murmur heard.  Pulmonary/Chest: Effort normal. No respiratory distress. He has decreased breath sounds in the right lower field and the left lower field. He has no  wheezes. He has no rales. He exhibits no tenderness.   Abdominal: Soft. Bowel sounds are normal. He exhibits no distension and no mass. There is no tenderness.   Musculoskeletal: Normal range of motion. He exhibits no edema or tenderness.   Neurological: He is alert and oriented to person, place, and time.   Skin: Skin is warm and dry. No rash noted.   Psychiatric: He has a normal mood and affect. His behavior is normal.       Significant Labs:   BMP:   Recent Labs   Lab 03/14/19  0917 03/15/19  0324    89    135*   K 4.1 4.4    109   CO2 25 18*   BUN 17 16   CREATININE 1.0 0.8   CALCIUM 9.0 7.8*   MG  --  1.9    and CBC   Recent Labs   Lab 03/14/19  0917 03/14/19  2311   WBC 6.49 8.51   HGB 9.6* 8.4*   HCT 29.1* 25.8*    191

## 2019-03-15 NOTE — TRANSFER OF CARE
"Anesthesia Transfer of Care Note    Patient: Edmond J Favre    Procedure(s) Performed: Procedure(s) (LRB):  CARDIAC ELECTROPHYSIOLOGY STUDY, DIAGNOSTIC (N/A)  INSERTION, CARDIAC PACEMAKER, DUAL CHAMBER (N/A)    Patient location: ICU    Anesthesia Type: general    Transport from OR: Transported from OR on 6-10 L/min O2 by face mask with adequate spontaneous ventilation. Continuos invasive BP monitoring in transport. Continuous SpO2 monitoring in transport. Continuous ECG monitoring in transport. Transported from OR on 100% O2 by closed face mask with adequate spontaneous ventilation    Post pain: adequate analgesia    Post assessment: no apparent anesthetic complications    Post vital signs: stable    Level of consciousness: awake, alert and oriented    Nausea/Vomiting: no nausea/vomiting    Complications: none    Transfer of care protocol was followed      Last vitals:   Visit Vitals  /60 (BP Location: Right arm, Patient Position: Lying)   Pulse 72   Temp 36.4 °C (97.6 °F) (Oral)   Resp (!) 21   Ht 5' 8" (1.727 m)   Wt 78.4 kg (172 lb 13.5 oz)   SpO2 98%   BMI 26.28 kg/m²     "

## 2019-03-15 NOTE — SUBJECTIVE & OBJECTIVE
Interval History: Feeling well today, although complains of not sleeping well for the last two nights.     Telemetry and EKG show persistence of LBBB    Baseline EKG:  NSR 89 BPM  TX interval 240 ms  QRS duration 110 ms  QT/QTc 366/445 ms     Post-Procedure EKG:  Sinus courtney 52 BPM  TX interval 338 ms  QRS duration 156 ms, LBBB  QT/QTc 552/513 m    Post-Procedure EKG (day 1):  Sinus 78 BPM  TX interval 284 ms  QRS duration 144 ms, LBBB  QT/QTc 446/508 ms    ROS  Objective:     Vital Signs (Most Recent):  Temp: 98.3 °F (36.8 °C) (03/15/19 1100)  Pulse: 72 (03/15/19 1300)  Resp: (!) 21 (03/15/19 1300)  BP: 126/60 (03/15/19 1000)  SpO2: 98 % (03/15/19 1300) Vital Signs (24h Range):  Temp:  [97.3 °F (36.3 °C)-98.6 °F (37 °C)] 98.3 °F (36.8 °C)  Pulse:  [46-80] 72  Resp:  [10-31] 21  SpO2:  [91 %-100 %] 98 %  BP: ()/(51-64) 126/60  Arterial Line BP: ()/(36-53) 166/50     Weight: 78.4 kg (172 lb 13.5 oz)  Body mass index is 26.28 kg/m².     SpO2: 98 %  O2 Device (Oxygen Therapy): room air    Physical Exam   Constitutional: He is oriented to person, place, and time. He appears well-developed and well-nourished.   HENT:   Head: Normocephalic and atraumatic.   Nose: Nose normal.   Mouth/Throat: No oropharyngeal exudate.   Eyes: Right eye exhibits no discharge. Left eye exhibits no discharge. No scleral icterus.   Neck: Normal range of motion. Neck supple. No JVD present.   Cardiovascular: Normal rate, regular rhythm, S1 normal and S2 normal. Exam reveals no gallop, no S3, no S4, no distant heart sounds, no friction rub, no midsystolic click and no opening snap.   No murmur heard.  Pulses:       Radial pulses are 2+ on the right side, and 2+ on the left side.        Femoral pulses are 2+ on the right side, and 2+ on the left side.  TVP in place   Pulmonary/Chest: Effort normal and breath sounds normal. No respiratory distress. He has no wheezes. He has no rales. He exhibits no tenderness.   Abdominal: Soft. Bowel  sounds are normal. He exhibits no distension. There is no tenderness. There is no rebound.   Musculoskeletal: Normal range of motion. He exhibits no edema, tenderness or deformity.   Lymphadenopathy:     He has no cervical adenopathy.   Neurological: He is alert and oriented to person, place, and time. No cranial nerve deficit.   Skin: Skin is warm and dry.   Psychiatric: He has a normal mood and affect. His behavior is normal.       Significant Labs: All pertinent lab results from the last 24 hours have been reviewed.    Significant Imaging: All pertinent images from the last 24h have been reviewed.

## 2019-03-15 NOTE — PLAN OF CARE
Extended Emergency Contact Information  Primary Emergency Contact: Favre,Andre  Address: [address] [place] [id number] [place]  Home Phone: [phone]  Relation: Son    Primary Doctor No    Future Appointments   Date Time Provider Department Center   4/30/2019  8:15 AM LAB, SAME DAY [facility]   4/30/2019  8:45 AM ECHO, MAIN CAMPUS [facility]   4/30/2019 10:00 AM [facility]     Payor: MEDICARE / Plan: MEDICARE PART A & B / Product Type: [employer] /       [id number] - [place], [place] - [address]  [address]  [place] [address]  Phone: [phone] Fax: [phone]       03/15/19 151   Discharge Assessment   Assessment Type Discharge Planning Assessment   Confirmed/corrected address and phone number on facesheet? No   Assessment information obtained from? Medical Record   Expected Length of Stay (days) 3   Communicated expected length of stay with patient/caregiver no   Prior to hospitilization cognitive status: Unable to Assess   Current cognitive status: Unable to Assess   Lives With spouse   Able to Return to Prior Arrangements yes   Is patient able to care for self after discharge? Yes   Readmission Within the Last 30 Days no previous admission in last 30 days   Patient currently being followed by outpatient case management? No   Equipment Currently Used at Home none   Do you have any problems affording any of your prescribed medications? TBD   Does the patient receive services at the [facility]madin Clinic? No   Discharge Plan A Home   Discharge Plan B Home with family   Patient/Family in Agreement with Plan unable to assess

## 2019-03-15 NOTE — ASSESSMENT & PLAN NOTE
S/p successful aortic valve replacement with 26 mm (-1.5 mL) Jermaine S3 valve performed via left transfemoral access.  The post TAVR 2D echo showed no perivalvular leak. The AV per 2d-echo mean gradient is 1.7 mmHg the peak velocity is 0.9 m/s.  On ASA and Plavix.

## 2019-03-15 NOTE — HPI
Reason for Consult: Post TAVR    HPI:  87 y.o. male who underwent TAVR today for severe AS (NYHA Class III sx).     Patient has been follewd by Bam Pickett for several years and known to have AS. 4.5 months ago he started to experience dyspnea on exertion. He gets short of breath doing regular household chores. He had angiogram that showed non obstructive CAD.      The patient has undergone the following TAVR work-up:   · ECHO (Date 2.20.19): SUDHAKAR= 0.9 cm2, MG= 50 mmHg, Peak Cyrus= 4.4 m/s, EF= 65%.   · LHC (Date 1.23.2019): Non obstructive CAD   · STS: 6%   · Frailty: 1/4   · Iliacs are >9.5 on R and > 9.5 on L   · LVOT area by CTA is 4.44 cm2 (27 mm X 21 mm) and Avg Diameter is 23.8 per Dr Crowe  · Incidental findings on CT: Asbestosis  · CT Surgery risk assessment: high risk, per Dr Damon due to Age  · Rhythm issues: NSR  · PFTs: FEV1 77% predicted, DLCO 58% predicted.  · Comorbidities: Hypertension, shoulder arthritis    He tolerated the procedure well but developed new LBBB. He was sleepy from anesthesia by the time of our encounter but denied chest pain, dyspnea, or lightheadedness.    Baseline EKG:  NSR 89 BPM  WI interval 240 ms  QRS duration 110 ms  QT/QTc 366/445 ms    Post-Procedure EKG:  Sinus courtney 52 BPM  WI interval 338 ms  QRS duration 156 ms  QT/QTc 552/513 m

## 2019-03-15 NOTE — ANESTHESIA PREPROCEDURE EVALUATION
03/15/2019  Pre-operative evaluation for Procedure(s) (LRB):  EP study, Pacemaker placement(N/A)    Edmond J Favre is a 88 y.o. male with PMH of bilateral carotid artery stenosis, HLD, nonobstructive CAD, and severe AS S/P TAVR who is scheduled for EP study, Pacemaker placement.     Prev airway: None on file    Patient Active Problem List   Diagnosis    Nonrheumatic aortic valve stenosis    Essential hypertension    Congestive heart failure    Dyslipidemia    Nodular calcific aortic valve stenosis       Review of patient's allergies indicates:   Allergen Reactions    Pcn [penicillins]         Current Facility-Administered Medications on File Prior to Visit   Medication Dose Route Frequency Provider Last Rate Last Dose    acetaminophen tablet 650 mg  650 mg Oral Q4H PRN Zan Obrien MD        aspirin chewable tablet 81 mg  81 mg Oral QHS Zan Obrien MD   81 mg at 03/14/19 2200    clopidogrel tablet 75 mg  75 mg Oral Daily Zan Obrien MD   75 mg at 03/15/19 0818    dextrose 5 % and 0.45 % NaCl infusion   Intravenous Continuous Ventura Whiteside  mL/hr at 03/14/19 0949      FLUoxetine capsule 20 mg  20 mg Oral Daily Zan Obrien MD   20 mg at 03/15/19 0818    magnesium oxide tablet 800 mg  800 mg Oral PRN Zan Obrien MD        magnesium oxide tablet 800 mg  800 mg Oral PRN Zan Obrien MD        norepinephrine 4 mg in dextrose 5% 250 mL infusion (premix) (titrating)  0.02 mcg/kg/min Intravenous Continuous Zan Obrien MD   Stopped at 03/14/19 1630    ondansetron injection 4 mg  4 mg Intravenous Q12H PRN Zan Obrien MD        potassium chloride 10% oral solution 40 mEq  40 mEq Oral PRN Zan Obrien MD        potassium chloride 10% oral solution 40 mEq  40 mEq Oral PRN Zan Obrien MD        potassium, sodium phosphates 280-160-250 mg packet 2 packet  2 packet Oral PRN Zan Obrien MD         potassium, sodium phosphates 280-160-250 mg packet 2 packet  2 packet Oral PRN Zan Obrien MD        potassium, sodium phosphates 280-160-250 mg packet 2 packet  2 packet Oral PRN Zan Obrien MD         Current Outpatient Medications on File Prior to Visit   Medication Sig Dispense Refill    aspirin 81 MG Chew Take 81 mg by mouth every evening.       clopidogrel (PLAVIX) 75 mg tablet Take 1 tablet (75 mg total) by mouth once daily. 30 tablet 11    clotrimazole (LOTRIMIN) 1 % Soln Apply topically 2 (two) times daily.      doxazosin (CARDURA) 4 MG tablet Take 4 mg by mouth every evening.      FLUoxetine 20 MG capsule Take 20 mg by mouth once daily.      hydrOXYzine (ATARAX) 50 MG tablet Take 50 mg by mouth 2 (two) times daily.      lisinopril (PRINIVIL,ZESTRIL) 5 MG tablet Take 5 mg by mouth once daily.      multivitamin capsule Take 1 capsule by mouth once daily.      simvastatin (ZOCOR) 40 MG tablet Take 40 mg by mouth every evening.         Past Surgical History:   Procedure Laterality Date    ANGIOGRAM, CORONARY ARTERY N/A 2019    Performed by Edd [facility]ett [facility] at [facility]    COLECTOMY      Replacement-valve-aortic N/A 3/14/2019    Performed by Cristian Zambrano MD at [facility] CATH LAB       Social History     Socioeconomic History    Marital status:      Spouse name: Not on file    Number of children: Not on file    Years of education: Not on file    Highest education level: Not on file   Social Needs    Financial resource strain: Not on file    Food insecurity - worry: Not on file    Food insecurity - inability: Not on file    Transportation needs - medical: Not on file    Transportation needs - non-medical: Not on file   Occupational History    Not on file   Tobacco Use    Smoking status: Former Smoker     Types: Cigarettes     Last attempt to quit: 1985     Years since quittin.2    Smokeless tobacco: Never Used   Substance and Sexual Activity     Alcohol use: Yes     Comment: glass of wine with dinner    Drug use: No    Sexual activity: No   Other Topics Concern    Not on file   Social History Narrative    Not on file         Vital Signs Range (Last 24H):  Temp:  [36.3 °C (97.3 °F)-37 °C (98.6 °F)]   Pulse:  [46-80]   Resp:  [10-31]   BP: ()/(51-64)   SpO2:  [91 %-100 %]   Arterial Line BP: ()/(36-53)       CBC:   Recent Labs     03/14/19  0917 03/14/19  2311   WBC 6.49 8.51   RBC 2.96* 2.58*   HGB 9.6* 8.4*   HCT 29.1* 25.8*    191   MCV 98 100*   MCH 32.4* 32.6*   MCHC 33.0 32.6       CMP:   Recent Labs     03/14/19  0917 03/15/19  0324    135*   K 4.1 4.4    109   CO2 25 18*   BUN 17 16   CREATININE 1.0 0.8    89   MG  --  1.9   CALCIUM 9.0 7.8*       INR  Recent Labs     03/14/19  0917   INR 0.9   APTT 21.3       EKG:  Sinus rhythm with 1st degree A-V block  Otherwise normal ECG  No previous ECGs available  Confirmed by Akhil Royal MD ([id number]) on 1/24/2019 3:40:47 PM    2D Echo 2/20/19:  · Normal left ventricular systolic function. The estimated ejection fraction is 65%  · Concentric left ventricular remodeling.  · Normal right ventricular systolic function.  · Indeterminate left ventricular diastolic function.  · Moderate to Severe aortic valve stenosis. Aortic valve area is 0.81 cm2; peak velocity is 4.46 m/s; mean gradient is 50.45 mmHg. DI= 0.21, Visually the valve appears moderate to severely stenotic but by doppler hemodynamics there is severe AS.  · Inadeqaute TR envelope to assess PA pressure  · Normal central venous pressure (3 mm Hg).    [facility] 1/23/19:  Left Main   The vessel was visualized by angiography, is large and is angiographically normal.   Left Anterior Descending   The vessel was visualized by angiography and is large. The vessel exhibits minimal luminal irregularities. 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   First Diagonal Branch   The vessel was visualized by angiography and is moderate  in size.   Second Diagonal Branch   The vessel was visualized by angiography and is small.   Left Circumflex   The vessel was visualized by angiography and is moderate in size. The vessel exhibits minimal luminal irregularities.   Prox Cx lesion is 40% stenosed. The lesion is calcified. Calcification amount is moderate. Lesion proximal tortuosity is moderate. Segment angulation is 45-90 degrees. The lesion is a type B lesion.   First Obtuse Marginal Branch   The vessel was visualized by angiography and is moderate in size. The vessel exhibits minimal luminal irregularities.   Second Obtuse Marginal Branch   The vessel was visualized by angiography and is moderate in size. The vessel exhibits minimal luminal irregularities.   Right Coronary Artery   The vessel was visualized by angiography and is large. The vessel exhibits minimal luminal irregularities.         Pre-op Assessment         Review of Systems    Physical Exam   Airway/Jaw/Neck:  Airway Findings: Mouth Opening: Normal Tongue: Normal  General Airway Assessment: Adult, Good  Mallampati: II  TM Distance: Normal, at least 6 cm       Chest/Lungs:  Chest/Lungs Findings: Normal Respiratory Rate         Mental Status:  Mental Status Findings:  Cooperative, Alert and Oriented         Anesthesia Plan  Type of Anesthesia, risks & benefits discussed:  Anesthesia Type:  general  Patient's Preference: General  Intra-op Monitoring Plan: standard ASA monitors  Intra-op Monitoring Plan Comments:   Post Op Pain Control Plan: per primary service following discharge from PACU  Post Op Pain Control Plan Comments: Per primary service  Induction:   IV  Beta Blocker:  Patient is not currently on a Beta-Blocker (No further documentation required).       Informed Consent: Patient understands risks and agrees with Anesthesia plan.  Questions answered. Anesthesia consent signed with patient.  ASA Score: 3     Day of Surgery Review of History & Physical:    H&P update referred to the  provider.         Ready For Surgery From Anesthesia Perspective.

## 2019-03-15 NOTE — PT/OT/SLP PROGRESS
Physical Therapy      Patient Name:  Edmond J Favre   MRN:  [id number]    Patient not seen today. Pt with TVP in place in AM. Pt out of room to EP lab in PM. Will follow-up when appropriate.    Minnie Sweet, PT, DPT  3/15/2019  [phone]

## 2019-03-15 NOTE — PLAN OF CARE
Problem: Adult Inpatient Plan of Care  Goal: Plan of Care Review  Outcome: Ongoing (interventions implemented as appropriate)  No acute events noted within the shift. Pt GCS 15. VSS. Remains with TVP on right IJ set at 40, with cordis (10 cc NSS infusing).  NPO since midnight. No BM.  UO adequate clear yellow per urinal. Bilateral groin site CDI.   Pulses 2+ UE and +1 LE.   Refused bath.Afrebrile. AM EKG done.  For EP study today.  Plan of care reviewed with pt. All concerns and questions addressed. WCTM

## 2019-03-15 NOTE — ASSESSMENT & PLAN NOTE
87 y.o. male who underwent TAVR today for severe AS (NYHA Class III sx).     He had angiogram that showed non obstructive CAD.     LVEF is 65%    He tolerated the procedure well but developed new LBBB.     Baseline EKG:  NSR 89 BPM  MT interval 240 ms  QRS duration 110 ms  QT/QTc 366/445 ms    Post-Procedure EKG:  Sinus courtney 52 BPM  MT interval 338 ms  QRS duration 156 ms  QT/QTc 552/513 m    Recommendations:  -NPO after midnight  -Avoid heparin products  -Avoid AV tyrell blockers  -Keep TVP in place  -Will perform EPS in the AM    Discussed w Dr Crow

## 2019-03-16 VITALS
BODY MASS INDEX: 26.19 KG/M2 | HEIGHT: 68 IN | SYSTOLIC BLOOD PRESSURE: 143 MMHG | TEMPERATURE: 98 F | WEIGHT: 172.81 LBS | OXYGEN SATURATION: 98 % | RESPIRATION RATE: 21 BRPM | HEART RATE: 80 BPM | DIASTOLIC BLOOD PRESSURE: 63 MMHG

## 2019-03-16 LAB
ANION GAP SERPL CALC-SCNC: 10 MMOL/L
BASOPHILS # BLD AUTO: 0.04 K/UL
BASOPHILS NFR BLD: 0.5 %
BUN SERPL-MCNC: 16 MG/DL
CALCIUM SERPL-MCNC: 7.9 MG/DL
CHLORIDE SERPL-SCNC: 109 MMOL/L
CO2 SERPL-SCNC: 18 MMOL/L
CREAT SERPL-MCNC: 0.8 MG/DL
DIFFERENTIAL METHOD: ABNORMAL
EOSINOPHIL # BLD AUTO: 0.2 K/UL
EOSINOPHIL NFR BLD: 2.6 %
ERYTHROCYTE [DISTWIDTH] IN BLOOD BY AUTOMATED COUNT: 14 %
EST. GFR  (AFRICAN AMERICAN): >60 ML/MIN/1.73 M^2
EST. GFR  (NON AFRICAN AMERICAN): >60 ML/MIN/1.73 M^2
GLUCOSE SERPL-MCNC: 73 MG/DL
HCT VFR BLD AUTO: 25.2 %
HGB BLD-MCNC: 8.4 G/DL
IMM GRANULOCYTES # BLD AUTO: 0.04 K/UL
IMM GRANULOCYTES NFR BLD AUTO: 0.5 %
LYMPHOCYTES # BLD AUTO: 0.5 K/UL
LYMPHOCYTES NFR BLD: 5.9 %
MAGNESIUM SERPL-MCNC: 1.7 MG/DL
MCH RBC QN AUTO: 31.8 PG
MCHC RBC AUTO-ENTMCNC: 33.3 G/DL
MCV RBC AUTO: 96 FL
MONOCYTES # BLD AUTO: 0.8 K/UL
MONOCYTES NFR BLD: 10.3 %
NEUTROPHILS # BLD AUTO: 6.1 K/UL
NEUTROPHILS NFR BLD: 80.2 %
NRBC BLD-RTO: 0 /100 WBC
PLATELET # BLD AUTO: 149 K/UL
PMV BLD AUTO: 8.9 FL
POTASSIUM SERPL-SCNC: 4.1 MMOL/L
RBC # BLD AUTO: 2.64 M/UL
SODIUM SERPL-SCNC: 137 MMOL/L
WBC # BLD AUTO: 7.65 K/UL

## 2019-03-16 PROCEDURE — 63600175 PHARM REV CODE 636 W HCPCS: Performed by: PHYSICIAN ASSISTANT

## 2019-03-16 PROCEDURE — 97116 GAIT TRAINING THERAPY: CPT

## 2019-03-16 PROCEDURE — 83735 ASSAY OF MAGNESIUM: CPT

## 2019-03-16 PROCEDURE — 85025 COMPLETE CBC W/AUTO DIFF WBC: CPT

## 2019-03-16 PROCEDURE — 97161 PT EVAL LOW COMPLEX 20 MIN: CPT

## 2019-03-16 PROCEDURE — 25000003 PHARM REV CODE 250: Performed by: INTERNAL MEDICINE

## 2019-03-16 PROCEDURE — 80048 BASIC METABOLIC PNL TOTAL CA: CPT

## 2019-03-16 RX ORDER — DOXYCYCLINE HYCLATE 100 MG
100 TABLET ORAL 2 TIMES DAILY
Qty: 10 TABLET | Refills: 0 | Status: ON HOLD | OUTPATIENT
Start: 2019-03-16 | End: 2019-09-30

## 2019-03-16 RX ADMIN — FLUOXETINE 20 MG: 20 CAPSULE ORAL at 08:03

## 2019-03-16 RX ADMIN — FUROSEMIDE 20 MG: 10 INJECTION, SOLUTION INTRAMUSCULAR; INTRAVENOUS at 05:03

## 2019-03-16 RX ADMIN — ACETAMINOPHEN 650 MG: 325 TABLET, FILM COATED ORAL at 05:03

## 2019-03-16 NOTE — PLAN OF CARE
Problem: Adult Inpatient Plan of Care  Goal: Plan of Care Review  Outcome: Ongoing (interventions implemented as appropriate)   See vital signs and assessments in flowsheets. See below for updates on today's progress.     Pulmonary: on room air with SpO2 of > 94%; no use of accessory muscle noted.    Cardiovascular: paced rhythm/ 1st degree HB ; -160 mmHg via arterial line (right radial).    Neurological: AAOx4; PERRLA ; apyrexial    Gastrointestinal: no bowel movement ; normoactive bowel sounds.    Genitourinary: voids spontaneously    Endocrine: no BG check ordered    Integumentary/Other: Bruised puncture sites (left and right femoral) and post op site (left chest)    Infusions: KVO to cordis    Patient progressing towards goals as tolerated, plan of care communicated and reviewed with Edmond J Favre and family. All concerns addressed. Will continue to monitor.

## 2019-03-16 NOTE — ASSESSMENT & PLAN NOTE
Fluid balance + with evidence of volume overload on physical exam.   CVP elevated following procedure  Continue PO lasix diuresis.

## 2019-03-16 NOTE — PT/OT/SLP EVAL
Physical Therapy Evaluation and Discharge     Patient Name:  Edmond J Favre   MRN:  [id number]    Recommendations:     Discharge Recommendations:  (home with family )   Discharge Equipment Recommendations: none   Barriers to discharge: None    Assessment:     Edmond J Favre is a 88 y.o. male admitted with a medical diagnosis of Nodular calcific aortic valve stenosis.  He presents with the following impairments/functional limitations:  (none). Pt ambulated with SBA. Pt and son educated on pacemaker precautions and donning of sling and swathe. Pt safe to return home with assistance from family. Pt does not require further acute skilled therapy intervention. Discharge from PT services and re-consult if pt experiences a change in status.       Rehab Prognosis: Good;     Recent Surgery: Procedure(s) (LRB):  CARDIAC ELECTROPHYSIOLOGY STUDY, DIAGNOSTIC (N/A)  INSERTION, CARDIAC PACEMAKER, DUAL CHAMBER (N/A) 1 Day Post-Op    Plan:     During this hospitalization, patient to be seen   to address the identified rehab impairments via   and progress toward the following goals:    · Plan of Care Expires:       Subjective     Chief Complaint: Pt c/o not eating or sleeping for 3 days   Patient/Family Comments/goals: to get better and return home   Pain/Comfort:  · Pain Rating 1: 0/10  · Pain Rating Post-Intervention 1: 0/10    Patients cultural, spiritual, Zoroastrian conflicts given the current situation: no    Living Environment:  Pt lives with wife in a H with no MILDRED. Pt states his son and daughter in law are available to assist as well.   Prior to admission, patients level of function was independent with mobility and ADLs.  Equipment used at home: none.  DME owned (not currently used): rolling walker and single point cane.  Upon discharge, patient will have assistance from wife, family.    Objective:     Communicated with RN prior to session.  Patient found sitting up in chair, with  arterial line, telemetry, pulse ox  (continuous)  upon PT entry to room.    General Precautions: Standard, fall, pacemaker   Orthopedic Precautions:N/A   Braces: Sling and swathe     Exams:  · Cognitive Exam:  Patient is AAOx4, followed all commands, communicates clearly and fluently  · Gross Motor Coordination:  WFL  · RUE ROM: WFL  · RUE Strength: WFL  · LUE ROM: NT 2/2 sling and swathe donned, pacemaker precautions   · LUE Strength: NT 2/2 pacemaker precautions   · RLE ROM: WFL  · RLE Strength: WFL  · LLE ROM: WFL  · LLE Strength: WFL    Functional Mobility:  · Bed Mobility:  NT 2/2 pt up in chair upon arrival   · Transfers:     · Sit to Stand:  contact guard assistance with no AD  · Gait: Pt ambulated 200 feet with no AD and SBA. Pt demo'd decreased harry, decreased step size, wide base of support. VSS, nurse present throughout.       Therapeutic Activities and Exercises:   Pt educated on role of PT/POC. Pt verbalized understanding.   Pt educated on pacemaker precautions including range restriction, no pulling/pushing/lifting. Pt and son educated on proper donning of sling and swathe. Pt and son verbalized understanding.     AM-PAC 6 CLICK MOBILITY  Total Score:23     Patient left up in chair with all lines intact, call button in reach and RN  present.    GOALS:   Multidisciplinary Problems     Physical Therapy Goals     Not on file          Multidisciplinary Problems (Resolved)        Problem: Physical Therapy Goal    Goal Priority Disciplines Outcome Goal Variances Interventions   Physical Therapy Goal   (Resolved)     PT, PT/OT Outcome(s) achieved                     History:     Past Medical History:   Diagnosis Date    Anxiety     Aortic stenosis, severe     Arthritis     Carotid artery stenosis     bilateral    Depression     Diverticulitis     Hyperlipidemia        Past Surgical History:   Procedure Laterality Date    ANGIOGRAM, CORONARY ARTERY N/A 1/23/2019    Performed by Edd Pickett MD at [facility] CATH LAB    COLECTOMY       Replacement-valve-aortic N/A 3/14/2019    Performed by Cristian Zambrano MD at [facility] CATH LAB       Time Tracking:     PT Received On: 03/16/19  PT Start Time: 1038     PT Stop Time: 1058  PT Total Time (min): 20 min     Billable Minutes: Evaluation 10 mins  and Gait Training 10 mins       Crhistianne Lopez, PT  03/16/2019

## 2019-03-16 NOTE — DISCHARGE SUMMARY
[facility]  Interventional Cardiology  Discharge Summary      Patient Name: Edmond J Favre  MRN: [id number]  Admission Date: 3/14/2019  Hospital Length of Stay: 2 days  Discharge Date and Time: No discharge date for patient encounter.  Attending Physician: Cristian Zambrano MD  Discharging Provider: Alex Vaca III, MD  Primary Care Physician: Primary Doctor No    HPI:  Edmond J Favre is a 87 y.o. male referred by Dr Pickett for evaluation of severe AS (NYHA Class III sx).     Patient has been follewd by Bam Pickett for several years and known to have AS. 4.5 months ago he started toe experience dyspnea on exertion. He gets short of breath doing regular household chores. He had angiogram that showed non obstructive CAD. Outside echo showed severe AS. He was referred for valve replacement options.      The patient has undergone the following TAVR work-up:   · ECHO (Date 2.20.19): SUDHAKAR= 0.9 cm2, MG= 50 mmHg, Peak Cyrus= 4.4 m/s, EF= 65%.   · LHC (Date 1.23.2019): Non obstructive CAD   · STS: 6%   · Frailty: 1/4   · Iliacs are >9.5 on R and > 9.5 on L   · LVOT area by CTA is 4.44 cm2 (27 mm X 21 mm) and Avg Diameter is 23.8 per Dr Crowe  · Incidental findings on CT: Asbestosis  · CT Surgery risk assessment: high risk, per Dr Damon due to Age  · Rhythm issues: NSR  · PFTs: FEV1 77% predicted, DLCO 58% predicted.  · Comorbidities: Hypertension, shoulder arthritis        Edmond J Favre is a 26 mm  Jermaine S3 valve candidate via Left TF access (18% oversized). (High bifurcation on RCFA)    Procedure(s) (LRB):  CARDIAC ELECTROPHYSIOLOGY STUDY, DIAGNOSTIC (N/A)  INSERTION, CARDIAC PACEMAKER, DUAL CHAMBER (N/A)     Indwelling Lines/Drains at time of discharge:  Lines/Drains/Airways     Central Venous Catheter Line                 Introducer 03/14/19 1407 right internal jugular 1 day          Arterial Line                 Arterial Line 03/14/19 1335 Right Radial 1 day                Hospital Course:    Favre was admitted and underwent successful placement of a 26 mm Jermaine S3 TAVR via TF access. He developed a new LBBB post-procedure. TVP was left in place and EP was consulted. Otherwise, there were no complications and he was taken to the CCU in stable condition. He remained stable overnight. The following morning, his LBBB persisted and he was taken for EP study. This revealed abnormal HV intervals and PPM was placed. He returned to the CCU in stable condition. He remained stable overnight. The following morning, he required diuresis for acute on chronic diastolic heart failure. That afternoon, he ambulated in the cordova without difficulty. He was eager to go home. It was felt he was stable for discharge.       Consults (From admission, onward)        Status Ordering Provider     Inpatient consult to Electrophysiology  Once     Provider:  (Not yet assigned)    LILLIANA Andrade          Significant Diagnostic Studies: Labs:   BMP:   Recent Labs   Lab 03/15/19  0324 03/16/19  0729   GLU 89 73   * 137   K 4.4 4.1    109   CO2 18* 18*   BUN 16 16   CREATININE 0.8 0.8   CALCIUM 7.8* 7.9*   MG 1.9 1.7   , CBC   Recent Labs   Lab 03/14/19  2311 03/16/19  0729   WBC 8.51 7.65   HGB 8.4* 8.4*   HCT 25.8* 25.2*    149*   , INR   Lab Results   Component Value Date    INR 0.9 03/14/2019    INR 0.9 02/20/2019    and All labs within the past 24 hours have been reviewed    Pending Diagnostic Studies:     Procedure Component Value Units Date/Time    EKG 12-lead [[id number]]     Order Status:  Sent Lab Status:  No result         * Nodular calcific aortic valve stenosis    S/p successful aortic valve replacement with 26 mm (-1.5 mL) Jermaine S3 valve performed via left transfemoral access.  The post TAVR 2D echo showed no perivalvular leak. The AV per 2d-echo mean gradient is 1.7 mmHg the peak velocity is 0.9 m/s.  On ASA and Plavix.      Postoperative anemia due to acute blood loss    Expected post-TAVR.    Asymptomatic.   No indication for transfusion.      LBBB (left bundle branch block)    New post-TAVR.   S/p PPM 3/15/19.      Acute on chronic diastolic heart failure    Fluid balance + with evidence of volume overload on physical exam.   CVP elevated following procedure  Continue PO lasix diuresis.      Essential hypertension    Controlled on current regimen.          Discharged Condition: good    Follow Up:  Follow-up Information     [facility] In 1 month.    Why:  One month follow up with echo and labs (appointment will be mailed to you)               Patient Instructions:   No discharge procedures on file.  Medications:  Reconciled Home Medications:      Medication List      START taking these medications    furosemide 20 MG tablet  Commonly known as:  LASIX  Take 1 pill daily as need for weight gain, swelling, or shortness of breath        CONTINUE taking these medications    aspirin 81 MG Chew  Take 81 mg by mouth every evening.     clopidogrel 75 mg tablet  Commonly known as:  PLAVIX  Take 1 tablet (75 mg total) by mouth once daily.     clotrimazole 1 % Soln  Commonly known as:  LOTRIMIN  Apply topically 2 (two) times daily.     doxazosin 4 MG tablet  Commonly known as:  CARDURA  Take 4 mg by mouth every evening.     FLUoxetine 20 MG capsule  Take 20 mg by mouth once daily.     hydrOXYzine 50 MG tablet  Commonly known as:  ATARAX  Take 50 mg by mouth 2 (two) times daily.     lisinopril 5 MG tablet  Commonly known as:  PRINIVIL,ZESTRIL  Take 5 mg by mouth once daily.     multivitamin capsule  Take 1 capsule by mouth once daily.     simvastatin 40 MG tablet  Commonly known as:  ZOCOR  Take 40 mg by mouth every evening.            Time spent on the discharge of patient: 30 minutes    Alex Vaca III, MD  Interventional Cardiology  [facility]

## 2019-03-16 NOTE — PLAN OF CARE
Problem: Physical Therapy Goal  Goal: Physical Therapy Goal  Outcome: Outcome(s) achieved Date Met: 03/16/19  Pt ambulated without assistance. Pt does not require further acute skilled therapy intervention. Discharge from PT services and re-consult if pt experiences a change in status.

## 2019-03-17 NOTE — ANESTHESIA POSTPROCEDURE EVALUATION
"Anesthesia Post Evaluation    Patient: Edmond J Favre    Procedure(s) Performed: Procedure(s) (LRB):  CARDIAC ELECTROPHYSIOLOGY STUDY, DIAGNOSTIC (N/A)  INSERTION, CARDIAC PACEMAKER, DUAL CHAMBER (N/A)    Final Anesthesia Type: general  Patient location during evaluation: PACU  Patient participation: Yes- Able to Participate  Level of consciousness: awake and alert and oriented  Post-procedure vital signs: reviewed and stable  Pain management: adequate  Airway patency: patent  PONV status at discharge: No PONV  Anesthetic complications: no      Cardiovascular status: blood pressure returned to baseline  Respiratory status: unassisted  Hydration status: euvolemic  Follow-up not needed.        Visit Vitals  BP (!) 143/63 (BP Location: Right arm, Patient Position: Lying)   Pulse 80   Temp 36.8 °C (98.2 °F) (Oral)   Resp (!) 21   Ht 5' 8" (1.727 m)   Wt 78.4 kg (172 lb 13.5 oz)   SpO2 98%   BMI 26.28 kg/m²       Pain/Erin Score: Pain Rating Prior to Med Admin: 3 (3/16/2019  5:02 AM)  Pain Rating Post Med Admin: 2 (3/16/2019  6:02 AM)        "

## 2019-03-18 DIAGNOSIS — Z95.0 CARDIAC PACEMAKER IN SITU: Primary | ICD-10-CM

## 2019-03-18 DIAGNOSIS — I44.7 LEFT BUNDLE BRANCH BLOCK: ICD-10-CM

## 2019-03-22 ENCOUNTER — CLINICAL SUPPORT (OUTPATIENT)
Dept: CARDIOLOGY | Facility: HOSPITAL | Age: 84
End: 2019-03-22
Attending: INTERNAL MEDICINE
Payer: MEDICARE

## 2019-03-22 DIAGNOSIS — I44.7 LEFT BUNDLE BRANCH BLOCK: ICD-10-CM

## 2019-03-22 DIAGNOSIS — Z95.0 CARDIAC PACEMAKER IN SITU: ICD-10-CM

## 2019-03-22 PROCEDURE — 93280 PM DEVICE PROGR EVAL DUAL: CPT

## 2019-04-08 ENCOUNTER — TELEPHONE (OUTPATIENT)
Dept: CARDIOLOGY | Facility: CLINIC | Age: 84
End: 2019-04-08

## 2019-04-08 NOTE — TELEPHONE ENCOUNTER
Patient's daughter in law called to state that patient is getting increasingly short of breath with exertion.  He quit taking his lasix every day.  Instructed to start back on lasix 20 mg daily and to call if he does not get better per Dr Whiteside.

## 2019-04-23 ENCOUNTER — HOSPITAL ENCOUNTER (OUTPATIENT)
Dept: CARDIOLOGY | Facility: CLINIC | Age: 84
Discharge: HOME | End: 2019-04-23
Attending: INTERNAL MEDICINE
Payer: MEDICARE

## 2019-04-23 ENCOUNTER — OFFICE VISIT (OUTPATIENT)
Dept: CARDIOLOGY | Facility: CLINIC | Age: 84
End: 2019-04-23
Payer: MEDICARE

## 2019-04-23 VITALS
HEIGHT: 66 IN | OXYGEN SATURATION: 96 % | WEIGHT: 171.94 LBS | HEART RATE: 76 BPM | SYSTOLIC BLOOD PRESSURE: 146 MMHG | DIASTOLIC BLOOD PRESSURE: 56 MMHG | BODY MASS INDEX: 27.63 KG/M2

## 2019-04-23 VITALS
SYSTOLIC BLOOD PRESSURE: 136 MMHG | HEART RATE: 68 BPM | DIASTOLIC BLOOD PRESSURE: 62 MMHG | WEIGHT: 172 LBS | BODY MASS INDEX: 27.64 KG/M2 | HEIGHT: 66 IN

## 2019-04-23 DIAGNOSIS — I10 ESSENTIAL HYPERTENSION: ICD-10-CM

## 2019-04-23 DIAGNOSIS — I35.0 NONRHEUMATIC AORTIC VALVE STENOSIS: ICD-10-CM

## 2019-04-23 DIAGNOSIS — Z95.2 S/P TAVR (TRANSCATHETER AORTIC VALVE REPLACEMENT): Primary | ICD-10-CM

## 2019-04-23 DIAGNOSIS — I50.32 CHRONIC DIASTOLIC HEART FAILURE: ICD-10-CM

## 2019-04-23 DIAGNOSIS — I44.7 LEFT BUNDLE BRANCH BLOCK: ICD-10-CM

## 2019-04-23 PROBLEM — I50.33 ACUTE ON CHRONIC DIASTOLIC HEART FAILURE: Status: RESOLVED | Noted: 2019-03-15 | Resolved: 2019-04-23

## 2019-04-23 PROBLEM — D62 POSTOPERATIVE ANEMIA DUE TO ACUTE BLOOD LOSS: Status: RESOLVED | Noted: 2019-03-15 | Resolved: 2019-04-23

## 2019-04-23 LAB
ASCENDING AORTA: 2.92 CM
AV INDEX (PROSTH): 0.68
AV MEAN GRADIENT: 5.82 MMHG
AV PEAK GRADIENT: 11.97 MMHG
AV VALVE AREA: 3.61 CM2
AV VELOCITY RATIO: 0.62
BSA FOR ECHO PROCEDURE: 1.91 M2
CV ECHO LV RWT: 0.39 CM
DOP CALC AO PEAK VEL: 1.73 M/S
DOP CALC AO VTI: 36.31 CM
DOP CALC LVOT AREA: 5.31 CM2
DOP CALC LVOT DIAMETER: 2.6 CM
DOP CALC LVOT PEAK VEL: 1.08 M/S
DOP CALC LVOT STROKE VOLUME: 131.23 CM3
DOP CALCLVOT PEAK VEL VTI: 24.73 CM
E WAVE DECELERATION TIME: 172.9 MSEC
E/A RATIO: 0.74
E/E' RATIO: 9
ECHO LV POSTERIOR WALL: 0.81 CM (ref 0.6–1.1)
FRACTIONAL SHORTENING: 28 % (ref 28–44)
INTERVENTRICULAR SEPTUM: 0.83 CM (ref 0.6–1.1)
IVRT: 0.09 MSEC
LA MAJOR: 5.71 CM
LA MINOR: 5.47 CM
LA WIDTH: 4.29 CM
LEFT ATRIUM SIZE: 4 CM
LEFT ATRIUM VOLUME INDEX: 43.4 ML/M2
LEFT ATRIUM VOLUME: 81.5 CM3
LEFT INTERNAL DIMENSION IN SYSTOLE: 2.98 CM (ref 2.1–4)
LEFT VENTRICLE DIASTOLIC VOLUME INDEX: 40.49 ML/M2
LEFT VENTRICLE DIASTOLIC VOLUME: 75.96 ML
LEFT VENTRICLE MASS INDEX: 54.5 G/M2
LEFT VENTRICLE SYSTOLIC VOLUME INDEX: 18.3 ML/M2
LEFT VENTRICLE SYSTOLIC VOLUME: 34.41 ML
LEFT VENTRICULAR INTERNAL DIMENSION IN DIASTOLE: 4.14 CM (ref 3.5–6)
LEFT VENTRICULAR MASS: 102.22 G
LV LATERAL E/E' RATIO: 7
LV SEPTAL E/E' RATIO: 12.6
MV PEAK A VEL: 0.85 M/S
MV PEAK E VEL: 0.63 M/S
PISA TR MAX VEL: 1.91 M/S
PULM VEIN S/D RATIO: 0.84
PV PEAK D VEL: 0.62 M/S
PV PEAK S VEL: 0.52 M/S
RA MAJOR: 4.86 CM
RA PRESSURE: 3 MMHG
RA WIDTH: 3.62 CM
RIGHT VENTRICULAR END-DIASTOLIC DIMENSION: 3.4 CM
RV TISSUE DOPPLER FREE WALL SYSTOLIC VELOCITY 1 (APICAL 4 CHAMBER VIEW): 12.45 M/S
SINUS: 3.12 CM
STJ: 2.23 CM
TDI LATERAL: 0.09
TDI SEPTAL: 0.05
TDI: 0.07
TR MAX PG: 14.59 MMHG
TRICUSPID ANNULAR PLANE SYSTOLIC EXCURSION: 1.95 CM
TV REST PULMONARY ARTERY PRESSURE: 18 MMHG

## 2019-04-23 PROCEDURE — 93306 TTE W/DOPPLER COMPLETE: CPT | Mod: PBBFAC | Performed by: INTERNAL MEDICINE

## 2019-04-23 PROCEDURE — 99214 OFFICE O/P EST MOD 30 MIN: CPT | Mod: PBBFAC,25

## 2019-04-23 PROCEDURE — 99999 PR PBB SHADOW E&M-EST. PATIENT-LVL IV: ICD-10-PCS | Mod: PBBFAC,,,

## 2019-04-23 PROCEDURE — 93306 TRANSTHORACIC ECHO (TTE) COMPLETE (CUPID ONLY): ICD-10-PCS | Mod: 26,S$PBB,, | Performed by: INTERNAL MEDICINE

## 2019-04-23 PROCEDURE — 99214 OFFICE O/P EST MOD 30 MIN: CPT | Mod: S$PBB,,, | Performed by: INTERNAL MEDICINE

## 2019-04-23 PROCEDURE — 99999 PR PBB SHADOW E&M-EST. PATIENT-LVL IV: CPT | Mod: PBBFAC,,,

## 2019-04-23 PROCEDURE — 99214 PR OFFICE/OUTPT VISIT, EST, LEVL IV, 30-39 MIN: ICD-10-PCS | Mod: S$PBB,,, | Performed by: INTERNAL MEDICINE

## 2019-04-23 NOTE — ASSESSMENT & PLAN NOTE
S/p successful aortic valve replacement with 26 mm (-1.5 mL) Jermaine S3 valve performed via left transfemoral access.  On ASA and Plavix.

## 2019-04-23 NOTE — PROGRESS NOTES
"Subjective:    Patient ID:  Edmond J Favre is a 88 y.o. male who presents for follow-up of TAVR.    Referring: Dr. Bam Pickett    HPI  Mr. Favre presents for 1 mon f/u s/p 26mm Jermaine S3 TAVR via TF access. He required a PPM post-TAVR for new LBBB post-TAVR and abnormal EP study. He has done well since his TAVR and he is without complaints today. His LEIVA has improved and he states his stamina is slowly returning. He denies CP, PND, orthopnea, or LE edema. He continues to follow with Dr. Pickett.     NYHA Class II, CCS Class 0    Review of Systems   Constitution: Negative for chills, diaphoresis, fever, weight gain and weight loss.   HENT: Negative for sore throat.    Eyes: Negative for blurred vision, vision loss in left eye, vision loss in right eye and visual disturbance.   Cardiovascular: Negative for chest pain, claudication, dyspnea on exertion, leg swelling, near-syncope, orthopnea, palpitations, paroxysmal nocturnal dyspnea and syncope.   Respiratory: Negative for cough, hemoptysis, shortness of breath, sputum production and wheezing.    Endocrine: Negative for cold intolerance and heat intolerance.   Hematologic/Lymphatic: Negative for adenopathy. Does not bruise/bleed easily.   Skin: Negative for rash.   Musculoskeletal: Negative for falls, muscle weakness and myalgias.   Gastrointestinal: Negative for abdominal pain, change in bowel habit, constipation, diarrhea, melena and nausea.   Genitourinary: Negative for bladder incontinence.   Neurological: Negative for dizziness, focal weakness, headaches, light-headedness, numbness and weakness.   Psychiatric/Behavioral: Negative for altered mental status.         Vitals:    04/23/19 0831 04/23/19 0834   BP: (!) 161/71 (!) 146/56   BP Location: Left arm Right arm   Patient Position: Sitting Sitting   BP Method: Large (Automatic) Large (Automatic)   Pulse: 79 76   SpO2: 96%    Weight: 78 kg (171 lb 15.3 oz)    Height: 5' 6" (1.676 m)    Body mass index is 27.75 " kg/m².     Objective:    Physical Exam   Constitutional: He is oriented to person, place, and time. He appears well-developed and well-nourished.   HENT:   Head: Normocephalic and atraumatic.   Eyes: Pupils are equal, round, and reactive to light. EOM are normal.   Neck: Neck supple. No JVD present. No tracheal deviation present. No thyromegaly present.   Cardiovascular: Normal rate, regular rhythm, S1 normal, S2 normal, intact distal pulses and normal pulses. PMI is not displaced. Exam reveals no gallop and no friction rub.   No murmur heard.  Pulmonary/Chest: Effort normal and breath sounds normal. No respiratory distress. He has no wheezes. He has no rales. He exhibits no tenderness.   Abdominal: Soft. Bowel sounds are normal. He exhibits no distension and no mass. There is no tenderness.   Musculoskeletal: Normal range of motion. He exhibits no edema or tenderness.   Neurological: He is alert and oriented to person, place, and time.   Skin: Skin is warm and dry. No rash noted.   Psychiatric: He has a normal mood and affect. His behavior is normal.         Assessment:       S/P TAVR (transcatheter aortic valve replacement)  S/p successful aortic valve replacement with 26 mm (-1.5 mL) Jermaine S3 valve performed via left transfemoral access.  On ASA and Plavix.     Chronic diastolic heart failure  Well compensated clinically at this time.   On Lasix prn (he has not had to use it since TAVR).     Essential hypertension  Controlled on current regimen.     Left bundle branch block  New post TAVR.   Abnormal EPS s/p PPM.        Plan:       F/U in 1 year (March 2020) with echo and labs.   Continue ASA indefinitely. OK to d/c Plavix in Sept 2019.   SBEP for life.

## 2019-06-06 ENCOUNTER — OFFICE VISIT (OUTPATIENT)
Dept: URGENT CARE | Facility: CLINIC | Age: 84
End: 2019-06-06
Payer: MEDICARE

## 2019-06-06 VITALS
HEIGHT: 66 IN | TEMPERATURE: 98 F | HEART RATE: 74 BPM | OXYGEN SATURATION: 96 % | BODY MASS INDEX: 27.64 KG/M2 | WEIGHT: 172 LBS | DIASTOLIC BLOOD PRESSURE: 62 MMHG | SYSTOLIC BLOOD PRESSURE: 132 MMHG | RESPIRATION RATE: 18 BRPM

## 2019-06-06 DIAGNOSIS — T14.90XA INJURY: Primary | ICD-10-CM

## 2019-06-06 PROCEDURE — 99203 PR OFFICE/OUTPT VISIT, NEW, LEVL III, 30-44 MIN: ICD-10-PCS | Mod: S$GLB,,, | Performed by: NURSE PRACTITIONER

## 2019-06-06 PROCEDURE — 73630 X-RAY EXAM OF FOOT: CPT | Mod: FY,LT,S$GLB, | Performed by: RADIOLOGY

## 2019-06-06 PROCEDURE — 99203 OFFICE O/P NEW LOW 30 MIN: CPT | Mod: S$GLB,,, | Performed by: NURSE PRACTITIONER

## 2019-06-06 PROCEDURE — 73630 XR FOOT COMPLETE 3 VIEW LEFT: ICD-10-PCS | Mod: FY,LT,S$GLB, | Performed by: RADIOLOGY

## 2019-06-06 NOTE — PATIENT INSTRUCTIONS
Please follow up with your Primary care provider within 2-5 days if your signs and symptoms have not resolved or worsen.     If your condition worsens or fails to improve we recommend that you receive another evaluation at the emergency room immediately or contact your primary medical clinic to discuss your concerns.    You must understand that you have received an Urgent Care treatment only and that you may be released before all of your medical problems are known or treated.   You, the patient, will arrange for follow up care as instructed.     ORTHO    R.I.C.E. to the affected joint or limb as needed:    Rest- the injured or sore extremity.  Ice- for the next 24-48 hours, alternating 20 minutes on and 20 minutes off as needed up to 3 times a day.   Compression-Use bandages to stabilize injured limb.  Check circulation to make sure  bandage is not too tight.    Elevate-when possible to reduce swelling.      Ice 20 minutes on and 20 minutes off as needed for pain.     Please drink plenty of fluids.    Please get plenty of rest.    Please return here or go to the Emergency Department for any concerns or worsening of condition.    Please be aware that narcotics can be addictive. If I gave you narcotics, I have given you a limited quantity to take as it is needed at this time. However take it sparingly and only when needed.  Do not operate machinery or drive on this medication.      If you were not prescribed an anti-inflammatory medication, and if you do not have any history of stomach/intestinal ulcers, or kidney disease, or are not taking a blood thinner such as Coumadin, Plavix, Pradaxa, Eloquis, or Xaralta for example, it is OK to take over the counter Ibuprofen or Advil or Motrin or Aleve as directed.  Do not take these medications on an empty stomach.    If you were given a splint wear it at all times unless otherwise instructed.     If you were given crutches use them as we instructed. Do not rest your armpits  on the foam pad or you risk compressing the nerves and the vessels there.    Please follow up with your primary care doctor or specialist as needed.    If you lose control of your bowel and/or bladder, please go to the nearest Emergency Department immediately.  If you lose sensation in between your legs by your genitalia and/or rectum, please go to the nearest Emergency Department immediately.  If you lose control or sensation of any extremity, please go to the nearest Emergency Department immediately.      R.I.C.E.    R.I.C.E. stands for Rest, Ice, Compression, and Elevation. Doing these things helps limit pain and swelling after an injury. R.I.C.E. also helps injuries heal faster. Use R.I.C.E. for sprains, strains, and severe bruises or bumps. Follow the tips on this handout and begin R.I.C.E. as soon as possible after an injury.  ? Rest  Pain is your bodys way of telling you to rest an injured area. Whether you have hurt an elbow, hand, foot, or knee, limiting its use will prevent further injury and help you heal.  ? Ice  Applying ice right after an injury helps prevent swelling and reduce pain. Dont place ice directly on your skin.  · Wrap a cold pack or bag of ice in a thin cloth. Place it over the injured area.  · Ice for 10 minutes every 3 hours. Dont ice for more than 20 minutes at a time.  ? Compression  Putting pressure (compression) on an injury helps prevent swelling and provides support.  · Wrap the injured area firmly with an elastic bandage. If your hand or foot tingles, becomes discolored, or feels cold to the touch, the bandage may be too tight. Rewrap it more loosely.  · If your bandage becomes too loose, rewrap it.  · Do not wear an elastic bandage overnight.  ? Elevation  Keeping an injury elevated helps reduce swelling, pain, and throbbing. Elevation is most effective when the injury is kept elevated higher than the heart.     Call your healthcare provider if you notice any of the  following:  · Fingers or toes feel numb, are cold to the touch, or change color  · Skin looks shiny or tight  · Pain, swelling, or bruising worsens and is not improved with elevation   Date Last Reviewed: 9/3/2015  © 2973-1675 [employer]. [address] [place] [address]. All rights reserved. This information is not intended as a substitute for professional medical care. Always follow your healthcare professional's instructions.

## 2019-06-06 NOTE — PROGRESS NOTES
"Subjective:       Patient ID: Edmond J Favre is a 88 y.o. male.    Vitals:  height is 5' 6" (1.676 m) and weight is 78 kg (172 lb). His temperature is 97.5 °F (36.4 °C). His blood pressure is 132/62 and his pulse is 74. His respiration is 18 and oxygen saturation is 96%.     Chief Complaint: Foot Injury    This is a 88 y.o. male who presents today with a chief complaint of left foot pain due to a window falling on his foot about a week ago.      Foot Injury    The incident occurred 5 to 7 days ago. The incident occurred at home. The injury mechanism was a direct blow. The pain is present in the left foot. The quality of the pain is described as aching. The pain is at a severity of 3/10. The pain is mild. The pain has been constant since onset. He reports no foreign bodies present. He has tried nothing for the symptoms.       Constitution: Negative for fatigue.   HENT: Negative for facial swelling and facial trauma.    Neck: Negative for neck stiffness.   Cardiovascular: Negative for chest trauma.   Eyes: Negative for eye trauma, double vision and blurred vision.   Gastrointestinal: Negative for abdominal trauma, abdominal pain and rectal bleeding.   Genitourinary: Negative for hematuria, genital trauma and pelvic pain.   Musculoskeletal: Positive for joint pain and joint swelling. Negative for pain, trauma, abnormal ROM of joint and pain with walking.   Skin: Negative for color change, wound, abrasion and laceration.   Neurological: Negative for dizziness, history of vertigo, light-headedness, coordination disturbances, altered mental status and loss of consciousness.   Hematologic/Lymphatic: Negative for history of bleeding disorder.   Psychiatric/Behavioral: Negative for altered mental status.       Objective:      Physical Exam   Constitutional: He is oriented to person, place, and time. Vital signs are normal. He appears well-developed and well-nourished. He is active and cooperative. No distress.   HENT: "   Head: Normocephalic and atraumatic.   Nose: Nose normal.   Mouth/Throat: Oropharynx is clear and moist and mucous membranes are normal.   Eyes: Conjunctivae and lids are normal.   Neck: Trachea normal, normal range of motion, full passive range of motion without pain and phonation normal. Neck supple.   Cardiovascular: Normal rate, regular rhythm, normal heart sounds, intact distal pulses and normal pulses.   Pulmonary/Chest: Effort normal and breath sounds normal.   Abdominal: Soft. Normal appearance and bowel sounds are normal. He exhibits no abdominal bruit, no pulsatile midline mass and no mass.   Musculoskeletal: He exhibits no edema or deformity.        Left foot: There is bony tenderness (with ecchymosis, see diagram). There is normal range of motion, no tenderness, no swelling, normal capillary refill, no crepitus, no deformity and no laceration.        Feet:    Neurological: He is alert and oriented to person, place, and time. He has normal strength and normal reflexes. No sensory deficit.   Skin: Skin is warm, dry and intact. He is not diaphoretic.   Psychiatric: He has a normal mood and affect. His speech is normal and behavior is normal. Judgment and thought content normal. Cognition and memory are normal.   Nursing note and vitals reviewed.      Assessment:       1. Injury        Plan:       X-ray Foot Complete Left    Result Date: 6/6/2019  EXAMINATION: XR FOOT COMPLETE 3 VIEW LEFT CLINICAL HISTORY: .  Injury, unspecified, initial encounter TECHNIQUE: AP, lateral and oblique views of the left foot were performed. FINDINGS: Note is made of an os naviculare and os peroneum.  There is no fracture, dislocation, or bony erosion.  There is osseous spur at the Achilles and plantar fascia attachment to the calcaneus.     As above. Electronically signed by: Neal Siddiqi MD Date:    06/06/2019 Time:    11:33    Injury  -     X-Ray Foot Complete Left; Future; Expected date: 06/06/2019      Patient Instructions      Please follow up with your Primary care provider within 2-5 days if your signs and symptoms have not resolved or worsen.     If your condition worsens or fails to improve we recommend that you receive another evaluation at the emergency room immediately or contact your primary medical clinic to discuss your concerns.    You must understand that you have received an Urgent Care treatment only and that you may be released before all of your medical problems are known or treated.   You, the patient, will arrange for follow up care as instructed.     ORTHO    R.I.C.E. to the affected joint or limb as needed:    Rest- the injured or sore extremity.  Ice- for the next 24-48 hours, alternating 20 minutes on and 20 minutes off as needed up to 3 times a day.   Compression-Use bandages to stabilize injured limb.  Check circulation to make sure  bandage is not too tight.    Elevate-when possible to reduce swelling.      Ice 20 minutes on and 20 minutes off as needed for pain.     Please drink plenty of fluids.    Please get plenty of rest.    Please return here or go to the Emergency Department for any concerns or worsening of condition.    Please be aware that narcotics can be addictive. If I gave you narcotics, I have given you a limited quantity to take as it is needed at this time. However take it sparingly and only when needed.  Do not operate machinery or drive on this medication.      If you were not prescribed an anti-inflammatory medication, and if you do not have any history of stomach/intestinal ulcers, or kidney disease, or are not taking a blood thinner such as Coumadin, Plavix, Pradaxa, Eloquis, or Xaralta for example, it is OK to take over the counter Ibuprofen or Advil or Motrin or Aleve as directed.  Do not take these medications on an empty stomach.    If you were given a splint wear it at all times unless otherwise instructed.     If you were given crutches use them as we instructed. Do not rest your  armpits on the foam pad or you risk compressing the nerves and the vessels there.    Please follow up with your primary care doctor or specialist as needed.    If you lose control of your bowel and/or bladder, please go to the nearest Emergency Department immediately.  If you lose sensation in between your legs by your genitalia and/or rectum, please go to the nearest Emergency Department immediately.  If you lose control or sensation of any extremity, please go to the nearest Emergency Department immediately.      R.I.C.E.    R.I.C.E. stands for Rest, Ice, Compression, and Elevation. Doing these things helps limit pain and swelling after an injury. R.I.C.E. also helps injuries heal faster. Use R.I.C.E. for sprains, strains, and severe bruises or bumps. Follow the tips on this handout and begin R.I.C.E. as soon as possible after an injury.  ? Rest  Pain is your bodys way of telling you to rest an injured area. Whether you have hurt an elbow, hand, foot, or knee, limiting its use will prevent further injury and help you heal.  ? Ice  Applying ice right after an injury helps prevent swelling and reduce pain. Dont place ice directly on your skin.  · Wrap a cold pack or bag of ice in a thin cloth. Place it over the injured area.  · Ice for 10 minutes every 3 hours. Dont ice for more than 20 minutes at a time.  ? Compression  Putting pressure (compression) on an injury helps prevent swelling and provides support.  · Wrap the injured area firmly with an elastic bandage. If your hand or foot tingles, becomes discolored, or feels cold to the touch, the bandage may be too tight. Rewrap it more loosely.  · If your bandage becomes too loose, rewrap it.  · Do not wear an elastic bandage overnight.  ? Elevation  Keeping an injury elevated helps reduce swelling, pain, and throbbing. Elevation is most effective when the injury is kept elevated higher than the heart.     Call your healthcare provider if you notice any of the  following:  · Fingers or toes feel numb, are cold to the touch, or change color  · Skin looks shiny or tight  · Pain, swelling, or bruising worsens and is not improved with elevation   Date Last Reviewed: 9/3/2015  © 9138-5615 [employer]. [address] [place] [id number]. All rights reserved. This information is not intended as a substitute for professional medical care. Always follow your healthcare professional's instructions.

## 2019-06-09 ENCOUNTER — TELEPHONE (OUTPATIENT)
Dept: URGENT CARE | Facility: CLINIC | Age: 84
End: 2019-06-09

## 2019-09-24 PROBLEM — R06.02 SOB (SHORTNESS OF BREATH): Status: ACTIVE | Noted: 2019-09-24

## 2019-09-30 PROBLEM — Z95.0 PACEMAKER: Status: ACTIVE | Noted: 2019-09-30

## 2019-09-30 PROBLEM — I25.10 CORONARY ARTERY DISEASE INVOLVING NATIVE HEART: Status: ACTIVE | Noted: 2019-09-30

## 2019-10-01 PROBLEM — R53.81 PHYSICAL DECONDITIONING: Status: ACTIVE | Noted: 2019-10-01

## 2019-10-02 PROBLEM — I48.91 ATRIAL FIBRILLATION WITH RVR: Status: ACTIVE | Noted: 2019-10-02

## 2019-10-28 ENCOUNTER — LAB VISIT (OUTPATIENT)
Dept: LAB | Facility: HOSPITAL | Age: 84
End: 2019-10-28
Attending: INTERNAL MEDICINE
Payer: MEDICARE

## 2019-10-28 DIAGNOSIS — I10 HTN (HYPERTENSION): Primary | ICD-10-CM

## 2019-10-28 LAB
ALBUMIN SERPL BCP-MCNC: 3.6 G/DL (ref 3.5–5.2)
ALP SERPL-CCNC: 66 U/L (ref 55–135)
ALT SERPL W/O P-5'-P-CCNC: 14 U/L (ref 10–44)
ANION GAP SERPL CALC-SCNC: 10 MMOL/L (ref 8–16)
AST SERPL-CCNC: 16 U/L (ref 10–40)
BASOPHILS # BLD AUTO: 0.04 K/UL (ref 0–0.2)
BASOPHILS NFR BLD: 0.5 % (ref 0–1.9)
BILIRUB SERPL-MCNC: 0.5 MG/DL (ref 0.1–1)
BUN SERPL-MCNC: 29 MG/DL (ref 8–23)
CALCIUM SERPL-MCNC: 8.8 MG/DL (ref 8.7–10.5)
CHLORIDE SERPL-SCNC: 103 MMOL/L (ref 95–110)
CO2 SERPL-SCNC: 29 MMOL/L (ref 23–29)
CREAT SERPL-MCNC: 1.1 MG/DL (ref 0.5–1.4)
DIFFERENTIAL METHOD: ABNORMAL
EOSINOPHIL # BLD AUTO: 0.3 K/UL (ref 0–0.5)
EOSINOPHIL NFR BLD: 3.7 % (ref 0–8)
ERYTHROCYTE [DISTWIDTH] IN BLOOD BY AUTOMATED COUNT: 15.6 % (ref 11.5–14.5)
EST. GFR  (AFRICAN AMERICAN): >60 ML/MIN/1.73 M^2
EST. GFR  (NON AFRICAN AMERICAN): 60 ML/MIN/1.73 M^2
GLUCOSE SERPL-MCNC: 101 MG/DL (ref 70–110)
HCT VFR BLD AUTO: 31.5 % (ref 40–54)
HGB BLD-MCNC: 9.8 G/DL (ref 14–18)
IMM GRANULOCYTES # BLD AUTO: 0.01 K/UL (ref 0–0.04)
IMM GRANULOCYTES NFR BLD AUTO: 0.1 % (ref 0–0.5)
LYMPHOCYTES # BLD AUTO: 1.3 K/UL (ref 1–4.8)
LYMPHOCYTES NFR BLD: 17 % (ref 18–48)
MCH RBC QN AUTO: 28.6 PG (ref 27–31)
MCHC RBC AUTO-ENTMCNC: 31.1 G/DL (ref 32–36)
MCV RBC AUTO: 92 FL (ref 82–98)
MONOCYTES # BLD AUTO: 0.8 K/UL (ref 0.3–1)
MONOCYTES NFR BLD: 11.1 % (ref 4–15)
NEUTROPHILS # BLD AUTO: 5.1 K/UL (ref 1.8–7.7)
NEUTROPHILS NFR BLD: 67.6 % (ref 38–73)
NRBC BLD-RTO: 0 /100 WBC
PLATELET # BLD AUTO: 150 K/UL (ref 150–350)
PMV BLD AUTO: 9.8 FL (ref 9.2–12.9)
POTASSIUM SERPL-SCNC: 4.1 MMOL/L (ref 3.5–5.1)
PROT SERPL-MCNC: 6.5 G/DL (ref 6–8.4)
RBC # BLD AUTO: 3.43 M/UL (ref 4.6–6.2)
SODIUM SERPL-SCNC: 142 MMOL/L (ref 136–145)
WBC # BLD AUTO: 7.55 K/UL (ref 3.9–12.7)

## 2019-10-28 PROCEDURE — 85025 COMPLETE CBC W/AUTO DIFF WBC: CPT

## 2019-10-28 PROCEDURE — 80053 COMPREHEN METABOLIC PANEL: CPT

## 2019-12-12 ENCOUNTER — OFFICE VISIT (OUTPATIENT)
Dept: URGENT CARE | Facility: CLINIC | Age: 84
End: 2019-12-12
Payer: MEDICARE

## 2019-12-12 VITALS
OXYGEN SATURATION: 96 % | SYSTOLIC BLOOD PRESSURE: 138 MMHG | WEIGHT: 166 LBS | HEART RATE: 72 BPM | HEIGHT: 66 IN | DIASTOLIC BLOOD PRESSURE: 61 MMHG | BODY MASS INDEX: 26.68 KG/M2 | RESPIRATION RATE: 18 BRPM | TEMPERATURE: 96 F

## 2019-12-12 DIAGNOSIS — K12.1 STOMATITIS: Primary | ICD-10-CM

## 2019-12-12 DIAGNOSIS — K14.8 TONGUE LESION: ICD-10-CM

## 2019-12-12 PROCEDURE — 99214 PR OFFICE/OUTPT VISIT, EST, LEVL IV, 30-39 MIN: ICD-10-PCS | Mod: S$GLB,,, | Performed by: NURSE PRACTITIONER

## 2019-12-12 PROCEDURE — 99214 OFFICE O/P EST MOD 30 MIN: CPT | Mod: S$GLB,,, | Performed by: NURSE PRACTITIONER

## 2019-12-12 RX ORDER — RIVAROXABAN 20 MG/1
1 TABLET, FILM COATED ORAL DAILY
Refills: 6 | Status: ON HOLD | COMMUNITY
Start: 2019-10-11 | End: 2024-03-25

## 2019-12-12 RX ORDER — CLOTRIMAZOLE AND BETAMETHASONE DIPROPIONATE 10; .64 MG/G; MG/G
CREAM TOPICAL
Refills: 2 | Status: ON HOLD | COMMUNITY
Start: 2019-09-07 | End: 2019-12-16 | Stop reason: HOSPADM

## 2019-12-12 RX ORDER — TRAZODONE HYDROCHLORIDE 100 MG/1
100 TABLET ORAL NIGHTLY
Refills: 6 | COMMUNITY
Start: 2019-10-30 | End: 2020-06-16

## 2019-12-12 NOTE — PROGRESS NOTES
"Subjective:       Patient ID: Edmond J Favre is a 88 y.o. male.    Vitals:  height is 5' 6" (1.676 m) and weight is 75.3 kg (166 lb). His tympanic temperature is 96.1 °F (35.6 °C). His blood pressure is 138/61 and his pulse is 72. His respiration is 18 and oxygen saturation is 96%.     Chief Complaint: Mouth Lesions    Patient stated that he noticed a bump on his tongue about 2 days ago. Tpday the bump bust and his tongue wont stop bleeding.    Mouth Lesions    Episode onset: 2 days. The onset was sudden. The problem has been gradually worsening. Associated symptoms include mouth sores. Pertinent negatives include no fever, no double vision, no diarrhea, no nausea, no vomiting, no congestion, no headaches, no sore throat, no cough and no rash.       Constitution: Negative for chills, fatigue and fever.   HENT: Positive for mouth sores and tongue lesion. Negative for congestion and sore throat.    Neck: Negative for painful lymph nodes.   Cardiovascular: Negative for chest pain and leg swelling.   Eyes: Negative for double vision and blurred vision.   Respiratory: Negative for cough and shortness of breath.    Gastrointestinal: Negative for nausea, vomiting and diarrhea.   Genitourinary: Negative for dysuria, frequency and urgency.   Musculoskeletal: Negative for joint pain, joint swelling, muscle cramps and muscle ache.   Skin: Negative for color change, pale and rash.   Allergic/Immunologic: Negative for seasonal allergies.   Neurological: Negative for dizziness, history of vertigo, light-headedness, passing out and headaches.   Hematologic/Lymphatic: Negative for swollen lymph nodes, easy bruising/bleeding and history of blood clots. Does not bruise/bleed easily.   Psychiatric/Behavioral: Negative for nervous/anxious, sleep disturbance and depression. The patient is not nervous/anxious.        Objective:      Physical Exam   Constitutional: He is oriented to person, place, and time. He appears well-developed and " well-nourished. He is cooperative.  Non-toxic appearance. He does not appear ill. No distress.   HENT:   Head: Normocephalic and atraumatic.   Right Ear: Hearing, tympanic membrane, external ear and ear canal normal.   Left Ear: Hearing, tympanic membrane, external ear and ear canal normal.   Nose: Nose normal. No mucosal edema, rhinorrhea or nasal deformity. No epistaxis. Right sinus exhibits no maxillary sinus tenderness and no frontal sinus tenderness. Left sinus exhibits no maxillary sinus tenderness and no frontal sinus tenderness.   Mouth/Throat: Uvula is midline, oropharynx is clear and moist and mucous membranes are normal. Oral lesions (left lateral aspect of tongue) present. No trismus in the jaw. Normal dentition. No uvula swelling. No posterior oropharyngeal erythema.   Eyes: Conjunctivae and lids are normal. Right eye exhibits no discharge. Left eye exhibits no discharge. No scleral icterus.   Neck: Trachea normal, normal range of motion, full passive range of motion without pain and phonation normal. Neck supple.   Cardiovascular: Normal rate, regular rhythm, normal heart sounds, intact distal pulses and normal pulses.   Pulmonary/Chest: Effort normal and breath sounds normal. No respiratory distress.   Abdominal: Soft. Normal appearance and bowel sounds are normal. He exhibits no distension, no pulsatile midline mass and no mass. There is no tenderness.   Musculoskeletal: Normal range of motion. He exhibits no edema or deformity.   Neurological: He is alert and oriented to person, place, and time. He exhibits normal muscle tone. Coordination normal.   Skin: Skin is warm, dry, intact, not diaphoretic and not pale.   Psychiatric: He has a normal mood and affect. His speech is normal and behavior is normal. Judgment and thought content normal. Cognition and memory are normal.   Nursing note and vitals reviewed.        Assessment:       1. Stomatitis    2. Tongue lesion        Plan:         Stomatitis  -      diphenhydrAMINE-aluminum-magnesium hydroxide-simethicone-lidocaine HCl 2%; Swish and spit 10 mLs every 4 (four) hours as needed. Swish and swallow for sore throat  Dispense: 100 mL; Refill: 0    Tongue lesion  -     Ambulatory referral to ENT      Patient Instructions   Always follow your healthcare professional's instructions.    You have received urgent care diagnosis and treatment and you may be released before all of your medical problems are known or treated. Unless you have been given a referral, you (the patient), will arrange for follow-up care as instructed.     If you have been given a referral, david will contact you (their phone number is [phone]). If they do NOT call you by the end of the business day, please call them the following day.      Stomatitis   Stomatitis is pain inside the mouth. It can involve open sores (canker sores) or redness and swelling. It occurs on the inside of the cheeks or on the tongue or gums. Stomatitis is more common in children, but it can occur at any age.  Causes  There are many causes of stomatitis, but the most common is viral infections. Other common causes are:  · Injury or irritation of the mouth lining  · Fungal or bacterial infections  · Using tobacco  · Irritating foods or chemicals, such as citrus fruit, toothpaste, or mouthwash  · Lack of certain vitamins, including vitamins B and C  · A weakened immune system  Symptoms  Stomatitis can result in a variety of symptoms, including:  · Redness inside the mouth  · Sores (ulcers) in the mouth  · Pain or burning  · Swelling  · Fever  Treatment  For a viral infection, usually only the symptoms are treated. Antibiotics do not kill viruses and are not recommended for this condition. This infection should go away within 7 to 10 days.  Home care  · Use a local numbing solution for pain relief. Ask the pharmacist for suggestions on which brand and strength is best for your child. You may apply this directly to  the sores with a cotton swab or with your finger. Use the numbing solution just before meals if eating is a problem.  · Older children may rinse their mouth with warm saltwater (½ teaspoon of salt in 1 glass of warm water). Be certain they spit the rinse out and do not swallow it.  · Feed your child a soft diet, along with plenty of fluids to prevent dehydration. If your child doesn't want to eat solid foods, it's OK for a few days, as long as he or she drinks lots of fluids. Cool drinks and frozen treats (sherbet) are soothing. Avoid citrus juices (orange juice, lemonade, etc.) and salty or spicy foods, since these may cause more pain in the mouth.  · Follow your healthcare provider's instructions on the use of over-the-counter pain medications such as acetaminophen for fever, fussiness, or pain. In infants older than 6 months, you may use children's ibuprofen. (Note: If your child has chronic liver or kidney disease or has ever had a stomach ulcer or gastrointestinal bleeding, talk with your healthcare provider before using these medicines.) Aspirin should never be given to anyone younger than 18 years of age who is ill with a viral infection or fever. It may cause severe liver or brain damage.  · Children should stay home until their fever is gone and they are eating and drinking well.  Follow-up care  Follow up with your childs healthcare provider, or as advised.  · If a culture was done, you will be notified if the treatment needs to be changed. You can call as directed for the results.  Call 911, or get immediate medical care  Contact emergency services right away if any of these occur:  · Trouble breathing  · Inability to swallow  · Extremes drowsiness or trouble awakening  · Fainting or loss of consciousness  · Rapid heart rate  · Seizure  · Stiff neck  When to seek medical advice  For a usually healthy child, call your child's healthcare provider right away if any of these occur:  · Your child is 3 months  old or younger and has a fever of 100.4°F (38°C) or higher. Get medical care right away. Fever in a young baby can be a sign of a dangerous infection.  · Your child is of any age and has repeated fevers above 104°F (40°C).  · Your child is younger than 2 years of age and a fever of 100.4°F (38°C) continues for more than 1 day.  · Your child is 2 years old or older and a fever of 100.4°F (38°C) continues for more than 3 days.  · Your child is unable to eat or drink due to mouth pain.  · Your child shows unusual fussiness, drowsiness or confusion  · Your child shows symptoms of dehydration, including no wet diapers for 8 hours, no tears when crying, sunken eyes, or a dry mouth  Date Last Reviewed: 7/30/2015  © 4223-2418 [employer]. [address] [place] [address]. All rights reserved. This information is not intended as a substitute for professional medical care. Always follow your healthcare professional's instructions.    Your provider discussed your plan of care with you during your physical exam. It was reviewed once more by the provider when giving you an after visit summary. If the patient is a minor, the discharge instructions were discussed with an adult and that adult acknowledge their understanding of the provider's teaching.

## 2019-12-12 NOTE — PATIENT INSTRUCTIONS
Always follow your healthcare professional's instructions.    You have received urgent care diagnosis and treatment and you may be released before all of your medical problems are known or treated. Unless you have been given a referral, you (the patient), will arrange for follow-up care as instructed.     If you have been given a referral, david will contact you (their phone number is [phone]). If they do NOT call you by the end of the business day, please call them the following day.      Stomatitis   Stomatitis is pain inside the mouth. It can involve open sores (canker sores) or redness and swelling. It occurs on the inside of the cheeks or on the tongue or gums. Stomatitis is more common in children, but it can occur at any age.  Causes  There are many causes of stomatitis, but the most common is viral infections. Other common causes are:  · Injury or irritation of the mouth lining  · Fungal or bacterial infections  · Using tobacco  · Irritating foods or chemicals, such as citrus fruit, toothpaste, or mouthwash  · Lack of certain vitamins, including vitamins B and C  · A weakened immune system  Symptoms  Stomatitis can result in a variety of symptoms, including:  · Redness inside the mouth  · Sores (ulcers) in the mouth  · Pain or burning  · Swelling  · Fever  Treatment  For a viral infection, usually only the symptoms are treated. Antibiotics do not kill viruses and are not recommended for this condition. This infection should go away within 7 to 10 days.  Home care  · Use a local numbing solution for pain relief. Ask the pharmacist for suggestions on which brand and strength is best for your child. You may apply this directly to the sores with a cotton swab or with your finger. Use the numbing solution just before meals if eating is a problem.  · Older children may rinse their mouth with warm saltwater (½ teaspoon of salt in 1 glass of warm water). Be certain they spit the rinse out and do not  swallow it.  · Feed your child a soft diet, along with plenty of fluids to prevent dehydration. If your child doesn't want to eat solid foods, it's OK for a few days, as long as he or she drinks lots of fluids. Cool drinks and frozen treats (sherbet) are soothing. Avoid citrus juices (orange juice, lemonade, etc.) and salty or spicy foods, since these may cause more pain in the mouth.  · Follow your healthcare provider's instructions on the use of over-the-counter pain medications such as acetaminophen for fever, fussiness, or pain. In infants older than 6 months, you may use children's ibuprofen. (Note: If your child has chronic liver or kidney disease or has ever had a stomach ulcer or gastrointestinal bleeding, talk with your healthcare provider before using these medicines.) Aspirin should never be given to anyone younger than 18 years of age who is ill with a viral infection or fever. It may cause severe liver or brain damage.  · Children should stay home until their fever is gone and they are eating and drinking well.  Follow-up care  Follow up with your childs healthcare provider, or as advised.  · If a culture was done, you will be notified if the treatment needs to be changed. You can call as directed for the results.  Call 911, or get immediate medical care  Contact emergency services right away if any of these occur:  · Trouble breathing  · Inability to swallow  · Extremes drowsiness or trouble awakening  · Fainting or loss of consciousness  · Rapid heart rate  · Seizure  · Stiff neck  When to seek medical advice  For a usually healthy child, call your child's healthcare provider right away if any of these occur:  · Your child is 3 months old or younger and has a fever of 100.4°F (38°C) or higher. Get medical care right away. Fever in a young baby can be a sign of a dangerous infection.  · Your child is of any age and has repeated fevers above 104°F (40°C).  · Your child is younger than 2 years of age  and a fever of 100.4°F (38°C) continues for more than 1 day.  · Your child is 2 years old or older and a fever of 100.4°F (38°C) continues for more than 3 days.  · Your child is unable to eat or drink due to mouth pain.  · Your child shows unusual fussiness, drowsiness or confusion  · Your child shows symptoms of dehydration, including no wet diapers for 8 hours, no tears when crying, sunken eyes, or a dry mouth  Date Last Reviewed: 7/30/2015  © 0660-0429 [employer]. [address] [place] [address]. All rights reserved. This information is not intended as a substitute for professional medical care. Always follow your healthcare professional's instructions.    Your provider discussed your plan of care with you during your physical exam. It was reviewed once more by the provider when giving you an after visit summary. If the patient is a minor, the discharge instructions were discussed with an adult and that adult acknowledge their understanding of the provider's teaching.

## 2019-12-15 ENCOUNTER — TELEPHONE (OUTPATIENT)
Dept: URGENT CARE | Facility: CLINIC | Age: 84
End: 2019-12-15

## 2019-12-15 PROBLEM — R07.9 CHEST PAIN: Status: ACTIVE | Noted: 2019-12-15

## 2019-12-15 PROBLEM — I48.0 PAROXYSMAL ATRIAL FIBRILLATION: Status: ACTIVE | Noted: 2019-12-15

## 2019-12-24 ENCOUNTER — LAB VISIT (OUTPATIENT)
Dept: LAB | Facility: HOSPITAL | Age: 84
End: 2019-12-24
Attending: INTERNAL MEDICINE
Payer: MEDICARE

## 2019-12-24 DIAGNOSIS — Z95.2 S/P TAVR (TRANSCATHETER AORTIC VALVE REPLACEMENT): ICD-10-CM

## 2019-12-24 DIAGNOSIS — D64.9 ANEMIA: ICD-10-CM

## 2019-12-24 LAB
BASOPHILS # BLD AUTO: 0.07 K/UL (ref 0–0.2)
BASOPHILS NFR BLD: 1.1 % (ref 0–1.9)
DIFFERENTIAL METHOD: ABNORMAL
EOSINOPHIL # BLD AUTO: 0.4 K/UL (ref 0–0.5)
EOSINOPHIL NFR BLD: 5.5 % (ref 0–8)
ERYTHROCYTE [DISTWIDTH] IN BLOOD BY AUTOMATED COUNT: 14.6 % (ref 11.5–14.5)
HCT VFR BLD AUTO: 26.3 % (ref 40–54)
HGB BLD-MCNC: 7.9 G/DL (ref 14–18)
IMM GRANULOCYTES # BLD AUTO: 0.07 K/UL (ref 0–0.04)
IMM GRANULOCYTES NFR BLD AUTO: 1.1 % (ref 0–0.5)
LYMPHOCYTES # BLD AUTO: 1.1 K/UL (ref 1–4.8)
LYMPHOCYTES NFR BLD: 16.8 % (ref 18–48)
MCH RBC QN AUTO: 27.7 PG (ref 27–31)
MCHC RBC AUTO-ENTMCNC: 30 G/DL (ref 32–36)
MCV RBC AUTO: 92 FL (ref 82–98)
MONOCYTES # BLD AUTO: 0.8 K/UL (ref 0.3–1)
MONOCYTES NFR BLD: 12.3 % (ref 4–15)
NEUTROPHILS # BLD AUTO: 4.2 K/UL (ref 1.8–7.7)
NEUTROPHILS NFR BLD: 63.2 % (ref 38–73)
NRBC BLD-RTO: 0 /100 WBC
PLATELET # BLD AUTO: 226 K/UL (ref 150–350)
PMV BLD AUTO: 9 FL (ref 9.2–12.9)
RBC # BLD AUTO: 2.85 M/UL (ref 4.6–6.2)
WBC # BLD AUTO: 6.59 K/UL (ref 3.9–12.7)

## 2019-12-24 PROCEDURE — 85025 COMPLETE CBC W/AUTO DIFF WBC: CPT

## 2020-01-27 ENCOUNTER — LAB VISIT (OUTPATIENT)
Dept: LAB | Facility: HOSPITAL | Age: 85
End: 2020-01-27
Attending: INTERNAL MEDICINE
Payer: MEDICARE

## 2020-01-27 DIAGNOSIS — I10 HTN (HYPERTENSION): ICD-10-CM

## 2020-01-27 DIAGNOSIS — Z79.899 LONG-TERM USE OF HIGH-RISK MEDICATION: ICD-10-CM

## 2020-01-27 DIAGNOSIS — I48.91 A-FIB: ICD-10-CM

## 2020-01-27 DIAGNOSIS — D64.9 ANEMIA: Primary | ICD-10-CM

## 2020-01-27 LAB
ALBUMIN SERPL BCP-MCNC: 3.7 G/DL (ref 3.5–5.2)
ALP SERPL-CCNC: 56 U/L (ref 55–135)
ALT SERPL W/O P-5'-P-CCNC: 11 U/L (ref 10–44)
ANION GAP SERPL CALC-SCNC: 10 MMOL/L (ref 8–16)
AST SERPL-CCNC: 18 U/L (ref 10–40)
BASOPHILS # BLD AUTO: 0.03 K/UL (ref 0–0.2)
BASOPHILS NFR BLD: 0.5 % (ref 0–1.9)
BILIRUB SERPL-MCNC: 0.3 MG/DL (ref 0.1–1)
BUN SERPL-MCNC: 20 MG/DL (ref 8–23)
CALCIUM SERPL-MCNC: 8.9 MG/DL (ref 8.7–10.5)
CHLORIDE SERPL-SCNC: 105 MMOL/L (ref 95–110)
CO2 SERPL-SCNC: 26 MMOL/L (ref 23–29)
CREAT SERPL-MCNC: 1.3 MG/DL (ref 0.5–1.4)
DIFFERENTIAL METHOD: ABNORMAL
EOSINOPHIL # BLD AUTO: 0.3 K/UL (ref 0–0.5)
EOSINOPHIL NFR BLD: 5.1 % (ref 0–8)
ERYTHROCYTE [DISTWIDTH] IN BLOOD BY AUTOMATED COUNT: 14.9 % (ref 11.5–14.5)
EST. GFR  (AFRICAN AMERICAN): 56 ML/MIN/1.73 M^2
EST. GFR  (NON AFRICAN AMERICAN): 49 ML/MIN/1.73 M^2
GLUCOSE SERPL-MCNC: 130 MG/DL (ref 70–110)
HCT VFR BLD AUTO: 25.1 % (ref 40–54)
HGB BLD-MCNC: 7.4 G/DL (ref 14–18)
IMM GRANULOCYTES # BLD AUTO: 0.02 K/UL (ref 0–0.04)
IMM GRANULOCYTES NFR BLD AUTO: 0.3 % (ref 0–0.5)
LYMPHOCYTES # BLD AUTO: 1 K/UL (ref 1–4.8)
LYMPHOCYTES NFR BLD: 16.1 % (ref 18–48)
MCH RBC QN AUTO: 25.7 PG (ref 27–31)
MCHC RBC AUTO-ENTMCNC: 29.5 G/DL (ref 32–36)
MCV RBC AUTO: 87 FL (ref 82–98)
MONOCYTES # BLD AUTO: 0.5 K/UL (ref 0.3–1)
MONOCYTES NFR BLD: 8.4 % (ref 4–15)
NEUTROPHILS # BLD AUTO: 4.4 K/UL (ref 1.8–7.7)
NEUTROPHILS NFR BLD: 69.6 % (ref 38–73)
NRBC BLD-RTO: 0 /100 WBC
PLATELET # BLD AUTO: 188 K/UL (ref 150–350)
PMV BLD AUTO: 10.3 FL (ref 9.2–12.9)
POTASSIUM SERPL-SCNC: 3.7 MMOL/L (ref 3.5–5.1)
PROT SERPL-MCNC: 6.9 G/DL (ref 6–8.4)
RBC # BLD AUTO: 2.88 M/UL (ref 4.6–6.2)
SODIUM SERPL-SCNC: 141 MMOL/L (ref 136–145)
WBC # BLD AUTO: 6.33 K/UL (ref 3.9–12.7)

## 2020-01-27 PROCEDURE — 82728 ASSAY OF FERRITIN: CPT

## 2020-01-27 PROCEDURE — 80053 COMPREHEN METABOLIC PANEL: CPT

## 2020-01-27 PROCEDURE — 82746 ASSAY OF FOLIC ACID SERUM: CPT

## 2020-01-27 PROCEDURE — 82607 VITAMIN B-12: CPT

## 2020-01-27 PROCEDURE — 83540 ASSAY OF IRON: CPT

## 2020-01-27 PROCEDURE — 36415 COLL VENOUS BLD VENIPUNCTURE: CPT

## 2020-01-27 PROCEDURE — 85025 COMPLETE CBC W/AUTO DIFF WBC: CPT

## 2020-01-28 LAB
FERRITIN SERPL-MCNC: 11 NG/ML (ref 20–300)
FOLATE SERPL-MCNC: 11.9 NG/ML (ref 4–24)
IRON SERPL-MCNC: 19 UG/DL (ref 45–160)
IRON SERPL-MCNC: 19 UG/DL (ref 45–160)
SATURATED IRON: 4 % (ref 20–50)
TOTAL IRON BINDING CAPACITY: 509 UG/DL (ref 250–450)
TRANSFERRIN SERPL-MCNC: 344 MG/DL (ref 200–375)
VIT B12 SERPL-MCNC: 465 PG/ML (ref 210–950)

## 2020-06-01 DIAGNOSIS — J44.89 OBSTRUCTIVE CHRONIC BRONCHITIS WITHOUT EXACERBATION: Primary | ICD-10-CM

## 2020-06-16 ENCOUNTER — OFFICE VISIT (OUTPATIENT)
Dept: CARDIOLOGY | Facility: CLINIC | Age: 85
End: 2020-06-16
Payer: MEDICARE

## 2020-06-16 ENCOUNTER — HOSPITAL ENCOUNTER (OUTPATIENT)
Dept: CARDIOLOGY | Facility: HOSPITAL | Age: 85
Discharge: HOME | End: 2020-06-16
Attending: PHYSICIAN ASSISTANT
Payer: MEDICARE

## 2020-06-16 VITALS
HEART RATE: 61 BPM | OXYGEN SATURATION: 98 % | WEIGHT: 167.75 LBS | SYSTOLIC BLOOD PRESSURE: 154 MMHG | HEIGHT: 67 IN | BODY MASS INDEX: 26.33 KG/M2 | DIASTOLIC BLOOD PRESSURE: 66 MMHG

## 2020-06-16 VITALS
HEIGHT: 67 IN | SYSTOLIC BLOOD PRESSURE: 138 MMHG | BODY MASS INDEX: 25.9 KG/M2 | HEART RATE: 57 BPM | DIASTOLIC BLOOD PRESSURE: 60 MMHG | WEIGHT: 165 LBS

## 2020-06-16 DIAGNOSIS — I50.32 CHRONIC DIASTOLIC HEART FAILURE: ICD-10-CM

## 2020-06-16 DIAGNOSIS — Z95.2 S/P TAVR (TRANSCATHETER AORTIC VALVE REPLACEMENT): ICD-10-CM

## 2020-06-16 DIAGNOSIS — Z95.0 PACEMAKER: ICD-10-CM

## 2020-06-16 DIAGNOSIS — I10 ESSENTIAL HYPERTENSION: ICD-10-CM

## 2020-06-16 DIAGNOSIS — I48.91 ATRIAL FIBRILLATION WITH RVR: ICD-10-CM

## 2020-06-16 LAB
ASCENDING AORTA: 3.14 CM
AV INDEX (PROSTH): 0.59
AV MEAN GRADIENT: 8 MMHG
AV PEAK GRADIENT: 16 MMHG
AV VALVE AREA: 3.09 CM2
AV VELOCITY RATIO: 0.62
BSA FOR ECHO PROCEDURE: 1.88 M2
CV ECHO LV RWT: 0.27 CM
DOP CALC AO PEAK VEL: 1.99 M/S
DOP CALC AO VTI: 49.11 CM
DOP CALC LVOT AREA: 5.2 CM2
DOP CALC LVOT DIAMETER: 2.58 CM
DOP CALC LVOT PEAK VEL: 1.24 M/S
DOP CALC LVOT STROKE VOLUME: 151.74 CM3
DOP CALCLVOT PEAK VEL VTI: 29.04 CM
E WAVE DECELERATION TIME: 190.27 MSEC
E/A RATIO: 0.88
E/E' RATIO: 8.53 M/S
ECHO LV POSTERIOR WALL: 0.55 CM (ref 0.6–1.1)
FRACTIONAL SHORTENING: 31 % (ref 28–44)
INTERVENTRICULAR SEPTUM: 0.79 CM (ref 0.6–1.1)
IVRT: 108.47 MSEC
LA MAJOR: 5.86 CM
LA MINOR: 5.84 CM
LA WIDTH: 3.8 CM
LEFT ATRIUM SIZE: 3.81 CM
LEFT ATRIUM VOLUME INDEX MOD: 37.3 ML/M2
LEFT ATRIUM VOLUME INDEX: 38.4 ML/M2
LEFT ATRIUM VOLUME MOD: 70 CM3
LEFT ATRIUM VOLUME: 71.99 CM3
LEFT INTERNAL DIMENSION IN SYSTOLE: 2.76 CM (ref 2.1–4)
LEFT VENTRICLE DIASTOLIC VOLUME INDEX: 37.6 ML/M2
LEFT VENTRICLE DIASTOLIC VOLUME: 70.55 ML
LEFT VENTRICLE MASS INDEX: 40 G/M2
LEFT VENTRICLE SYSTOLIC VOLUME INDEX: 15.2 ML/M2
LEFT VENTRICLE SYSTOLIC VOLUME: 28.59 ML
LEFT VENTRICULAR INTERNAL DIMENSION IN DIASTOLE: 4.01 CM (ref 3.5–6)
LEFT VENTRICULAR MASS: 74.36 G
LV LATERAL E/E' RATIO: 7.11 M/S
LV SEPTAL E/E' RATIO: 10.67 M/S
MV PEAK A VEL: 0.73 M/S
MV PEAK E VEL: 0.64 M/S
MV STENOSIS PRESSURE HALF TIME: 55.18 MS
MV VALVE AREA P 1/2 METHOD: 3.99 CM2
PISA TR MAX VEL: 2.4 M/S
PULM VEIN S/D RATIO: 1.28
PV PEAK D VEL: 0.39 M/S
PV PEAK S VEL: 0.5 M/S
RA MAJOR: 4.71 CM
RA PRESSURE: 3 MMHG
RA WIDTH: 3.75 CM
RIGHT VENTRICULAR END-DIASTOLIC DIMENSION: 3.98 CM
RV TISSUE DOPPLER FREE WALL SYSTOLIC VELOCITY 1 (APICAL 4 CHAMBER VIEW): 14.17 CM/S
SINUS: 3.3 CM
STJ: 2.62 CM
TDI LATERAL: 0.09 M/S
TDI SEPTAL: 0.06 M/S
TDI: 0.08 M/S
TR MAX PG: 23 MMHG
TRICUSPID ANNULAR PLANE SYSTOLIC EXCURSION: 2.2 CM
TV REST PULMONARY ARTERY PRESSURE: 26 MMHG

## 2020-06-16 PROCEDURE — 99214 PR OFFICE/OUTPT VISIT, EST, LEVL IV, 30-39 MIN: ICD-10-PCS | Mod: S$PBB,,, | Performed by: INTERNAL MEDICINE

## 2020-06-16 PROCEDURE — 99999 PR PBB SHADOW E&M-EST. PATIENT-LVL III: CPT | Mod: PBBFAC,,,

## 2020-06-16 PROCEDURE — 93306 TTE W/DOPPLER COMPLETE: CPT | Mod: 26,,, | Performed by: INTERNAL MEDICINE

## 2020-06-16 PROCEDURE — 93306 TTE W/DOPPLER COMPLETE: CPT

## 2020-06-16 PROCEDURE — 99213 OFFICE O/P EST LOW 20 MIN: CPT | Mod: PBBFAC,25

## 2020-06-16 PROCEDURE — 93306 TRANSTHORACIC ECHO (TTE) COMPLETE (CUPID ONLY): ICD-10-PCS | Mod: 26,,, | Performed by: INTERNAL MEDICINE

## 2020-06-16 PROCEDURE — 99214 OFFICE O/P EST MOD 30 MIN: CPT | Mod: S$PBB,,, | Performed by: INTERNAL MEDICINE

## 2020-06-16 PROCEDURE — 99999 PR PBB SHADOW E&M-EST. PATIENT-LVL III: ICD-10-PCS | Mod: PBBFAC,,,

## 2020-06-16 NOTE — PROGRESS NOTES
Subjective:    Patient ID:  Edmond J Favre is a 89 y.o. male who presents for follow-up of TAVR.    Referring Physician: Dr. Bam Pickett    HPI  Mr. Favre presents for 1 year f/u s/p 26mm Jermaine S3 TAVR via TF access. He required a PPM post-TAVR for new LBBB post-TAVR and abnormal EP study. He has done well since his TAVR and he is without complaints today. Still experienced occasional SOB when he cuts his grass but it is significantly better than before TAVR. He denies CP, PND, orthopnea, or LE edema. He continues to follow with Dr. Pickett.     He has a history of Afib on Xarelto. CHADSVASc 4 (4%/yr), HASBLED 3 (5.8%/yr). He admits to falling in the shower with subsequent head trauma. He often feels unsteady after standing in the shower. He reports easy bruising/bleeding.     NYHA: II CCS: 0    Review of Systems   Constitution: Negative for chills and fever.   HENT: Negative for sore throat.    Eyes: Negative for blurred vision.   Cardiovascular: Negative for chest pain, claudication, cyanosis, dyspnea on exertion, irregular heartbeat, leg swelling, near-syncope, orthopnea, palpitations, paroxysmal nocturnal dyspnea and syncope.   Respiratory: Negative for cough and sputum production.    Hematologic/Lymphatic: Bruises/bleeds easily.   Skin: Negative for itching, rash and suspicious lesions.   Musculoskeletal: Positive for falls.   Gastrointestinal: Negative for abdominal pain and change in bowel habit.   Genitourinary: Negative for dysuria.   Neurological: Negative for disturbances in coordination, dizziness and loss of balance.   Psychiatric/Behavioral: Negative for altered mental status.          Past Medical History:   Diagnosis Date    Anxiety     Aortic stenosis, severe     Arthritis     Carotid artery stenosis     bilateral    Depression     Diverticulitis     Hyperlipidemia     Hypertension      Current Outpatient Medications on File Prior to Visit   Medication Sig Dispense Refill    FLUoxetine 20 MG  "capsule Take 40 mg by mouth once daily.       furosemide (LASIX) 20 MG tablet Take 1 pill daily as need for weight gain, swelling, or shortness of breath (Patient taking differently: Take 20 mg by mouth twice a week. ) 30 tablet 5    metoprolol succinate (TOPROL-XL) 25 MG 24 hr tablet Take 1 tablet (25 mg total) by mouth once daily. 30 tablet 11    multivitamin capsule Take 1 capsule by mouth once daily.      simvastatin (ZOCOR) 40 MG tablet Take 40 mg by mouth every evening.      XARELTO 20 mg Tab Take 1 tablet by mouth once daily.  6    [DISCONTINUED] clopidogrel (PLAVIX) 75 mg tablet Take 1 tablet (75 mg total) by mouth once daily. 30 tablet 11    [DISCONTINUED] hydrOXYzine (ATARAX) 50 MG tablet Take 50 mg by mouth once daily.       [DISCONTINUED] traZODone (DESYREL) 100 MG tablet Take 100 mg by mouth every evening.  6     No current facility-administered medications on file prior to visit.      Vitals:    06/16/20 1015 06/16/20 1020   BP: (!) 167/67 (!) 154/66   BP Location: Right arm Left arm   Patient Position: Sitting Sitting   BP Method: Large (Automatic) Large (Automatic)   Pulse: 61 61   SpO2: 98%    Weight: 76.1 kg (167 lb 12.3 oz)    Height: 5' 7" (1.702 m)      Body mass index is 26.28 kg/m².  Objective:    Physical Exam   Constitutional: He is oriented to person, place, and time. He appears well-developed and well-nourished. No distress.   HENT:   Head: Normocephalic and atraumatic.   Eyes: EOM are normal.   Neck: Normal range of motion. No JVD present.   Cardiovascular: Normal rate, regular rhythm and intact distal pulses.   No murmur heard.  Pulmonary/Chest: Effort normal and breath sounds normal. No respiratory distress.   Abdominal: Soft. He exhibits no distension.   Musculoskeletal:         General: No edema.   Neurological: He is alert and oriented to person, place, and time.   Skin: Skin is warm and dry. He is not diaphoretic.   Vitals reviewed.        TTE 6/16/20  · Normal left " ventricular systolic function. The estimated ejection fraction is 65%.  · No wall motion abnormalities.  · Indeterminate left ventricular diastolic function.  · Normal right ventricular systolic function.  · Mild left atrial enlargement.  · There is a transcutaneously-placed aortic bioprosthesis present. The AR appears probably mild ( probably 1+) to at very most mild-moderate ( at most 1-2+) perivalvular AR.  · Mild tricuspid regurgitation.  · Normal central venous pressure (3 mmHg).  · The estimated PA systolic pressure is 26 mmHg.    Assessment:   S/P TAVR (transcatheter aortic valve replacement)  S/p successful aortic valve replacement with 26 mm (-1.5 mL) Jermaine S3 valve performed via left transfemoral access.  Mild PVL on TTE today reviewed by Dr. Whiteside.     Pacemaker  New LBBB post TAVR.   Abnormal EPS s/p PPM.     Atrial fibrillation with RVR  On Xarelto.  CHADSVASc 4 (4%/yr), HASBLED 3 (5.8%/yr).  History of falling in the shower.   Patient is a Watchman Candidate. He will call if he wishes to pursue.    Chronic diastolic heart failure  Well compensated clinically at this time.   On Lasix prn (he has not had to use it since TAVR).     Essential hypertension  Controlled on current regimen.     Plan:     F/U PRN.   Continue Xarelto.   SBEP for life.   Continue to follow up with Dr. Pickett.  Will consider Watchman Device and call back if he is interested.        Mirta Velarde PA-C  Interventional Cardiology  [facility]

## 2020-06-16 NOTE — ASSESSMENT & PLAN NOTE
S/p successful aortic valve replacement with 26 mm (-1.5 mL) Jermaine S3 valve performed via left transfemoral access.  On ASA.

## 2020-08-18 DIAGNOSIS — Z11.59 SCREENING EXAMINATION FOR ARTHROPOD-BORNE VIRAL DISEASE: ICD-10-CM

## 2020-09-07 ENCOUNTER — HOSPITAL ENCOUNTER (OUTPATIENT)
Dept: PREADMISSION TESTING | Facility: HOSPITAL | Age: 85
Discharge: HOME | End: 2020-09-07
Attending: INTERNAL MEDICINE
Payer: MEDICARE

## 2020-09-07 DIAGNOSIS — Z11.59 SCREENING EXAMINATION FOR ARTHROPOD-BORNE VIRAL DISEASE: ICD-10-CM

## 2020-09-07 PROCEDURE — U0003 INFECTIOUS AGENT DETECTION BY NUCLEIC ACID (DNA OR RNA); SEVERE ACUTE RESPIRATORY SYNDROME CORONAVIRUS 2 (SARS-COV-2) (CORONAVIRUS DISEASE [COVID-19]), AMPLIFIED PROBE TECHNIQUE, MAKING USE OF HIGH THROUGHPUT TECHNOLOGIES AS DESCRIBED BY CMS-2020-01-R: HCPCS

## 2020-09-08 LAB — SARS-COV-2 RNA RESP QL NAA+PROBE: NOT DETECTED

## 2020-09-10 ENCOUNTER — HOSPITAL ENCOUNTER (OUTPATIENT)
Dept: PULMONOLOGY | Facility: HOSPITAL | Age: 85
Discharge: HOME | End: 2020-09-10
Attending: INTERNAL MEDICINE
Payer: MEDICARE

## 2020-09-10 DIAGNOSIS — J44.89 OBSTRUCTIVE CHRONIC BRONCHITIS WITHOUT EXACERBATION: ICD-10-CM

## 2020-09-10 PROCEDURE — 99900031 HC PATIENT EDUCATION (STAT)

## 2020-09-10 PROCEDURE — 94729 DIFFUSING CAPACITY: CPT

## 2020-09-10 PROCEDURE — 94060 EVALUATION OF WHEEZING: CPT

## 2020-09-10 PROCEDURE — 94727 GAS DIL/WSHOT DETER LNG VOL: CPT

## 2021-01-04 ENCOUNTER — IMMUNIZATION (OUTPATIENT)
Dept: FAMILY MEDICINE | Facility: CLINIC | Age: 86
End: 2021-01-04
Payer: MEDICARE

## 2021-01-04 DIAGNOSIS — Z23 NEED FOR VACCINATION: ICD-10-CM

## 2021-01-04 PROCEDURE — 91300 COVID-19, MRNA, LNP-S, PF, 30 MCG/0.3 ML DOSE VACCINE: CPT | Mod: PBBFAC,PN

## 2021-01-25 ENCOUNTER — IMMUNIZATION (OUTPATIENT)
Dept: FAMILY MEDICINE | Facility: CLINIC | Age: 86
End: 2021-01-25
Payer: MEDICARE

## 2021-01-25 DIAGNOSIS — Z23 NEED FOR VACCINATION: Primary | ICD-10-CM

## 2021-01-25 PROCEDURE — 0002A COVID-19, MRNA, LNP-S, PF, 30 MCG/0.3 ML DOSE VACCINE: CPT | Mod: PBBFAC,PN

## 2021-01-25 PROCEDURE — 91300 COVID-19, MRNA, LNP-S, PF, 30 MCG/0.3 ML DOSE VACCINE: CPT | Mod: PBBFAC,PN

## 2021-09-28 ENCOUNTER — IMMUNIZATION (OUTPATIENT)
Dept: FAMILY MEDICINE | Facility: CLINIC | Age: 86
End: 2021-09-28
Payer: MEDICARE

## 2021-09-28 DIAGNOSIS — Z23 NEED FOR VACCINATION: Primary | ICD-10-CM

## 2021-09-28 PROCEDURE — 0003A COVID-19, MRNA, LNP-S, PF, 30 MCG/0.3 ML DOSE VACCINE: CPT | Mod: PBBFAC,PN

## 2021-09-28 PROCEDURE — 91300 COVID-19, MRNA, LNP-S, PF, 30 MCG/0.3 ML DOSE VACCINE: CPT | Mod: PBBFAC,PN

## 2021-12-16 NOTE — ANESTHESIA POSTPROCEDURE EVALUATION
"Anesthesia Post Evaluation    Patient: Edmond J Favre    Procedure(s) Performed: Procedure(s) (LRB):  Replacement-valve-aortic (N/A)    Final Anesthesia Type: general  Patient location during evaluation: ICU  Patient participation: Yes- Able to Participate  Level of consciousness: awake and alert  Post-procedure vital signs: reviewed and stable  Pain management: adequate  Airway patency: patent  PONV status at discharge: No PONV  Anesthetic complications: no      Cardiovascular status: stable  Respiratory status: unassisted and spontaneous ventilation  Hydration status: euvolemic  Follow-up not needed.        Visit Vitals  BP (!) 103/51 (BP Location: Right arm, Patient Position: Lying)   Pulse 76   Temp 37 °C (98.6 °F) (Oral)   Resp 15   Ht 5' 8" (1.727 m)   Wt 78.4 kg (172 lb 13.5 oz)   SpO2 96%   BMI 26.28 kg/m²       Pain/Erin Score: Pain Rating Prior to Med Admin: 0 (3/14/2019  5:15 PM)  Erin Score: 9 (3/14/2019  5:15 PM)        " From: Fede Juarez  To: Dr. Nela Garsia  Sent: 12/16/2021 12:53 PM EST  Subject: Prescription Question    I need a refill for esomeprazole   please

## 2022-02-08 NOTE — Clinical Note
Catheter  Aortic Root. 1000psi, 20ml/sec, 10ml.The vessel(s) were injected and visualized.  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

## 2022-02-18 NOTE — ASSESSMENT & PLAN NOTE
Controlled on current regimen.    Patient stopped in today to drop off a form for Dr Jesse Gómez to sign and mail back to her home. 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 The form for Dr Jesse Gómez to fill out and sign, and the pre-stamped and addressed envelope is placed in Dr Davon Roach (barcode included).

## 2024-03-22 ENCOUNTER — OFFICE VISIT (OUTPATIENT)
Dept: UROLOGY | Facility: CLINIC | Age: 89
End: 2024-03-22
Payer: MEDICARE

## 2024-03-22 VITALS — HEIGHT: 67 IN | BODY MASS INDEX: 26.64 KG/M2 | WEIGHT: 169.75 LBS

## 2024-03-22 DIAGNOSIS — N13.30 HYDROURETERONEPHROSIS: ICD-10-CM

## 2024-03-22 DIAGNOSIS — R10.9 ABDOMINAL PAIN, UNSPECIFIED ABDOMINAL LOCATION: ICD-10-CM

## 2024-03-22 DIAGNOSIS — R39.14 BENIGN PROSTATIC HYPERPLASIA WITH INCOMPLETE BLADDER EMPTYING: ICD-10-CM

## 2024-03-22 DIAGNOSIS — R10.9 FLANK PAIN: ICD-10-CM

## 2024-03-22 DIAGNOSIS — N40.1 BENIGN PROSTATIC HYPERPLASIA WITH INCOMPLETE BLADDER EMPTYING: ICD-10-CM

## 2024-03-22 DIAGNOSIS — N13.2 HYDRONEPHROSIS WITH URINARY OBSTRUCTION DUE TO URETERAL CALCULUS: ICD-10-CM

## 2024-03-22 DIAGNOSIS — R33.9 URINARY RETENTION: ICD-10-CM

## 2024-03-22 DIAGNOSIS — N20.1 URETERAL STONE: Primary | ICD-10-CM

## 2024-03-22 PROCEDURE — 99999 PR PBB SHADOW E&M-EST. PATIENT-LVL V: CPT | Mod: PBBFAC,,, | Performed by: UROLOGY

## 2024-03-22 PROCEDURE — 99215 OFFICE O/P EST HI 40 MIN: CPT | Mod: PBBFAC,PO | Performed by: UROLOGY

## 2024-03-22 PROCEDURE — 99205 OFFICE O/P NEW HI 60 MIN: CPT | Mod: S$PBB,,, | Performed by: UROLOGY

## 2024-03-22 RX ORDER — FLUOXETINE HYDROCHLORIDE 40 MG/1
40 CAPSULE ORAL
COMMUNITY
Start: 2024-03-16

## 2024-03-22 RX ORDER — DOXAZOSIN 8 MG/1
8 TABLET ORAL DAILY
Qty: 90 TABLET | Refills: 3 | Status: SHIPPED | OUTPATIENT
Start: 2024-03-22 | End: 2025-03-22

## 2024-03-22 RX ORDER — CIPROFLOXACIN 2 MG/ML
400 INJECTION, SOLUTION INTRAVENOUS
Status: CANCELLED | OUTPATIENT
Start: 2024-03-22

## 2024-03-22 RX ORDER — DUTASTERIDE 0.5 MG/1
0.5 CAPSULE, LIQUID FILLED ORAL DAILY
Qty: 90 CAPSULE | Refills: 3 | Status: SHIPPED | OUTPATIENT
Start: 2024-03-22 | End: 2025-03-22

## 2024-03-22 RX ORDER — SODIUM CHLORIDE 9 MG/ML
INJECTION, SOLUTION INTRAVENOUS CONTINUOUS
Status: CANCELLED | OUTPATIENT
Start: 2024-03-22

## 2024-03-22 NOTE — PATIENT INSTRUCTIONS
Will left ureteroscopy with laser lithotripsy and possible stent placement on 3/25/24 at [facility]  Patient to be NPO past midnight on 03/25/2024.  Patient to not take aspirin or blood thinners.

## 2024-03-22 NOTE — PROGRESS NOTES
Subjective:      Patient ID: Edmond J Favre is a 89 y.o. male.    Chief Complaint: ureteral stone    Mr. Favre is a 89-year-old gentleman who presented to the emergency room approximately 48 hours ago.  He was found have significant urinary frequency and urgency with distended bladder.  He had a catheter placed.  He underwent CT scan of the abdomen pelvis due to left-sided flank pain.  He was found have left hydro nephrosis with a distal ureteral stone measuring a proximally 6 x 8 mm causing ureteral hydro nephrosis above.  The patient was discharged home with a Joyner in place with a prescription for Flomax.  The patient is already on Cardura 4 mg daily.  We will increase him to 8 mg daily once he is completed the Flomax.  The patient presents today with his daughter and daughter-in-law and is ready to schedule treatment for this calculus.      Flank Pain  This is a new problem. The current episode started in the past 7 days. The problem occurs constantly. The problem is unchanged. The pain is present in the costovertebral angle. The quality of the pain is described as aching. The pain is severe. Associated symptoms include abdominal pain. Pertinent negatives include no bladder incontinence, bowel incontinence, chest pain, dysuria, fever, headaches, leg pain, numbness, paresis, paresthesias, pelvic pain, perianal numbness, tingling, weakness or weight loss. Risk factors include renal stones and sedentary lifestyle.     Review of Systems   Constitutional:  Negative for activity change, appetite change, chills, diaphoresis, fatigue, fever, unexpected weight change and weight loss.   HENT:  Negative for congestion, hearing loss, sinus pressure and trouble swallowing.    Eyes:  Negative for photophobia, pain, discharge and visual disturbance.   Respiratory:  Negative for apnea, cough and shortness of breath.    Cardiovascular:  Negative for chest pain, palpitations and leg swelling.   Gastrointestinal:  Positive for  abdominal pain. Negative for abdominal distention, anal bleeding, blood in stool, bowel incontinence, constipation, diarrhea, nausea, rectal pain and vomiting.   Endocrine: Negative for cold intolerance, heat intolerance, polydipsia, polyphagia and polyuria.   Genitourinary:  Positive for flank pain. Negative for bladder incontinence, decreased urine volume, difficulty urinating, dysuria, enuresis, frequency, genital sores, hematuria, pelvic pain, penile discharge, penile pain, penile swelling, scrotal swelling, testicular pain and urgency.   Musculoskeletal:  Negative for arthralgias, back pain and myalgias.   Skin:  Negative for color change, pallor, rash and wound.   Allergic/Immunologic: Negative for environmental allergies, food allergies and immunocompromised state.   Neurological:  Negative for dizziness, tingling, seizures, weakness, numbness, headaches and paresthesias.   Hematological:  Negative for adenopathy. Does not bruise/bleed easily.   Psychiatric/Behavioral: Negative.        Objective:     Physical Exam  Vitals and nursing note reviewed.   Constitutional:       Appearance: Normal appearance. He is well-developed.   HENT:      Head: Normocephalic and atraumatic.      Right Ear: External ear normal.      Left Ear: External ear normal.      Nose: Nose normal.      Mouth/Throat:      Mouth: Mucous membranes are moist.      Pharynx: Oropharynx is clear. No oropharyngeal exudate or posterior oropharyngeal erythema.   Eyes:      Extraocular Movements: Extraocular movements intact.      Conjunctiva/sclera: Conjunctivae normal.      Pupils: Pupils are equal, round, and reactive to light.   Cardiovascular:      Rate and Rhythm: Normal rate and regular rhythm.      Heart sounds: Normal heart sounds.   Pulmonary:      Effort: Pulmonary effort is normal.      Breath sounds: Rales (bilateral) present.   Abdominal:      General: Bowel sounds are normal. There is no distension.      Palpations: Abdomen is soft.  There is no mass.      Tenderness: There is no abdominal tenderness. There is no guarding or rebound.      Hernia: No hernia is present.   Genitourinary:     Penis: Normal.       Testes: Normal.      Comments: Patient with no CVA tenderness, normal penis and scrotum.  Bladder nontender.  Musculoskeletal:         General: Normal range of motion.      Cervical back: Normal range of motion and neck supple.   Skin:     General: Skin is warm and dry.      Capillary Refill: Capillary refill takes less than 2 seconds.   Neurological:      General: No focal deficit present.      Mental Status: He is alert and oriented to person, place, and time.      Deep Tendon Reflexes: Reflexes are normal and symmetric.   Psychiatric:         Mood and Affect: Mood normal.         Behavior: Behavior normal.         Thought Content: Thought content normal.         Judgment: Judgment normal.        Assessment:      1. Ureteral stone    2. Urinary retention    3. Hydronephrosis with urinary obstruction due to ureteral calculus    4. Abdominal pain, unspecified abdominal location    5. Flank pain    6. Benign prostatic hyperplasia with incomplete bladder emptying      Plan:     Patient Instructions   Will left ureteroscopy with laser lithotripsy and possible stent placement on 3/25/24 at [facility]  Patient to be NPO past midnight on 03/25/2024.  Patient to not take aspirin or blood thinners.

## 2024-03-25 PROBLEM — N13.30 HYDRONEPHROSIS OF LEFT KIDNEY: Status: ACTIVE | Noted: 2024-03-25

## 2024-03-25 PROBLEM — N20.1 URETERAL STONE: Status: ACTIVE | Noted: 2024-03-25

## 2024-03-25 PROBLEM — R10.9 LEFT FLANK PAIN: Status: ACTIVE | Noted: 2024-03-25

## 2024-05-28 PROBLEM — J96.01 ACUTE RESPIRATORY FAILURE WITH HYPOXIA: Status: ACTIVE | Noted: 2024-05-28

## 2024-05-28 PROBLEM — I48.91 ATRIAL FIBRILLATION WITH RVR: Status: RESOLVED | Noted: 2019-10-02 | Resolved: 2024-05-28

## 2024-05-28 PROBLEM — N18.32 CHRONIC KIDNEY DISEASE, STAGE 3B: Status: ACTIVE | Noted: 2024-05-28

## 2024-05-28 PROBLEM — D64.9 NORMOCYTIC ANEMIA: Status: ACTIVE | Noted: 2024-05-28

## 2024-05-28 PROBLEM — N13.8 BENIGN PROSTATIC HYPERPLASIA WITH URINARY OBSTRUCTION: Status: ACTIVE | Noted: 2024-05-28

## 2024-05-28 PROBLEM — L03.116 CELLULITIS OF LEFT LOWER LEG: Status: ACTIVE | Noted: 2024-05-28

## 2024-05-28 PROBLEM — N40.1 BENIGN PROSTATIC HYPERPLASIA WITH URINARY OBSTRUCTION: Status: ACTIVE | Noted: 2024-05-28

## 2024-05-29 ENCOUNTER — TELEPHONE (OUTPATIENT)
Dept: UROLOGY | Facility: CLINIC | Age: 89
End: 2024-05-29
Payer: MEDICARE

## 2024-05-29 NOTE — TELEPHONE ENCOUNTER
PT DAUGHTER WILL CALL BACK ONCE SHE GET TO THE HOSPITAL       ----- Message from Mayo Andres sent at 5/29/2024 11:31 AM CDT -----  Type:  Sooner Apoointment Request    Caller is requesting a sooner appointment.    Caller will accept being placed on the waitlist and is requesting a message be sent to doctor.  Name of Caller:Lani with case management   When is the first available appointment?none in epic  Symptoms:hosp /fu  Would the patient rather a call back or a response via [employer]? call  Best Call Back Number:[phone]  Additional Information:   Please reach out to pt daughter to schedule

## 2024-05-30 ENCOUNTER — PATIENT OUTREACH (OUTPATIENT)
Dept: ADMINISTRATIVE | Facility: CLINIC | Age: 89
End: 2024-05-30
Payer: MEDICARE

## 2024-05-30 ENCOUNTER — TELEPHONE (OUTPATIENT)
Dept: UROLOGY | Facility: CLINIC | Age: 89
End: 2024-05-30
Payer: MEDICARE

## 2024-05-30 PROBLEM — R07.9 CHEST PAIN IN ADULT: Status: RESOLVED | Noted: 2019-12-15 | Resolved: 2024-05-30

## 2024-05-30 PROBLEM — J96.01 ACUTE RESPIRATORY FAILURE WITH HYPOXIA: Status: RESOLVED | Noted: 2024-05-28 | Resolved: 2024-05-30

## 2024-05-30 NOTE — TELEPHONE ENCOUNTER
----- Message from Yoko Smith sent at 5/30/2024  9:17 AM CDT -----  Contact: Shante  Type:  Needs Medical Advice    Who Called: pt  Symptoms (please be specific): pt daughter needs a call right back concerning a ER follow up might need to schedule appt   Would the patient rather a call back or a response via [facility]? call  Best Call Back Number: [phone]  Additional Information:

## 2024-05-31 ENCOUNTER — TELEPHONE (OUTPATIENT)
Dept: UROLOGY | Facility: CLINIC | Age: 89
End: 2024-05-31
Payer: MEDICARE

## 2024-05-31 NOTE — TELEPHONE ENCOUNTER
Received message that pt needs urgent uro appt to f/u from hospital visit where he was discharged with a catheter. Message states he is threatening to pull the catheter out himself. I called the only number that is listed in chart. I left VM indicating that Dr. Dickson is out this week but Dr. Barragan has appointments as early as Monday. I offered them three appointment times. I left my callback number and requested return phone call. Here is the message received:      ----- Message -----   From: Favre, Edmond J   Sent: 5/30/2024   9:06 PM CDT   To: [facility] Urology Clinical Support   Subject: Appointment Request                                Appointment Request From: Edmond J Favre      With Provider: Ousmane Dickson [[facility] - Urology]      Preferred Date Range: 5/31/2024 - 5/31/2024      Preferred Times: Any Time      Reason for visit: was seen in the ER admitted to hospital they said bladder was not emptying they put a catheter in.  He is trying to remove it he already took the bag off.  We need to come in to get it taken out      Comments:   please take the catheter out he will pull it out.  Thank you

## 2024-06-03 ENCOUNTER — OFFICE VISIT (OUTPATIENT)
Dept: UROLOGY | Facility: CLINIC | Age: 89
End: 2024-06-03
Payer: MEDICARE

## 2024-06-03 VITALS
HEART RATE: 84 BPM | SYSTOLIC BLOOD PRESSURE: 132 MMHG | WEIGHT: 162.06 LBS | HEIGHT: 68 IN | BODY MASS INDEX: 24.56 KG/M2 | DIASTOLIC BLOOD PRESSURE: 74 MMHG

## 2024-06-03 DIAGNOSIS — N13.4 HYDROURETER ON RIGHT: ICD-10-CM

## 2024-06-03 DIAGNOSIS — N20.0 BILATERAL RENAL STONES: ICD-10-CM

## 2024-06-03 DIAGNOSIS — N13.30 HYDRONEPHROSIS OF LEFT KIDNEY: ICD-10-CM

## 2024-06-03 DIAGNOSIS — N40.1 BENIGN PROSTATIC HYPERPLASIA WITH URINARY OBSTRUCTION: Primary | ICD-10-CM

## 2024-06-03 DIAGNOSIS — Z97.8 FOLEY CATHETER PRESENT: ICD-10-CM

## 2024-06-03 DIAGNOSIS — N13.8 BENIGN PROSTATIC HYPERPLASIA WITH URINARY OBSTRUCTION: Primary | ICD-10-CM

## 2024-06-03 DIAGNOSIS — N13.4 HYDROURETER ON LEFT: ICD-10-CM

## 2024-06-03 PROCEDURE — 99214 OFFICE O/P EST MOD 30 MIN: CPT | Mod: S$PBB,,, | Performed by: NURSE PRACTITIONER

## 2024-06-03 PROCEDURE — 99999 PR PBB SHADOW E&M-EST. PATIENT-LVL III: CPT | Mod: PBBFAC,,, | Performed by: NURSE PRACTITIONER

## 2024-06-03 PROCEDURE — 99213 OFFICE O/P EST LOW 20 MIN: CPT | Mod: PBBFAC,PO | Performed by: NURSE PRACTITIONER

## 2024-06-03 NOTE — PATIENT INSTRUCTIONS
Remove suresh catheter today in-clinic.   If patient is unable to urinate in 6 hours (by 8:30 PM), go to ER for evaluation.  If suresh is reinserted, please notify Urology Clinic in [place] at [phone] during business hours.   Continue taking doxazosin (Cardura) 8 mg daily for BPH.   Continue taking dutasteride (Avodart) 0.5 mg daily for BPH.   Follow-up as needed.

## 2024-06-03 NOTE — PROGRESS NOTES
Subjective:       Patient ID: Edmond J Favre is a 89 y.o. male.    Chief Complaint: Urinary Retention and Hospital Follow Up    Patient is here today for a hospital follow-up for urinary retention from 5/28 - 5/29/2024 and suresh catheter tube removal. Catheter bag already removed by patient. Patient is here today with his daughter/caregiver (Shante) and daughter-in-law (Ngoc). CT was performed during hospital stay, showed bilateral hydroureter without calculus noted. He also had left hydronephrosis and tiny non-obstructing bilateral renal calculus. Results discussed with patient and family. It appears patient has BPH with obstructive uropathy, but should be confirmed with cystoscopy by Dr. Dickson. Patient's daughter/caregiver (Shante) declined. Family informed that the bilateral hydroureters are indications of obstruction and further evaluation is recommended. If this goes unevaluated it can continue to worsen and eventually cause acute kidney injury and then failure. Family still declined. Daughter does not want to further evaluate kidney stones. Encouraged family that patient should continue taking doxazosin 8 mg daily and dutasteride 0.5 mg daily for his BPH.       Review of Systems   Constitutional:  Negative for chills and fever.   Gastrointestinal:  Negative for abdominal pain, change in bowel habit, nausea and vomiting.   Genitourinary:  Positive for difficulty urinating (suresh catheter in place). Negative for decreased urine volume, dysuria, hematuria, penile pain, penile swelling, scrotal swelling and testicular pain.   Neurological:  Positive for weakness.         Objective:      Physical Exam  Vitals and nursing note reviewed.   Constitutional:       General: He is not in acute distress.     Appearance: He is well-developed and normal weight. He is not ill-appearing.   HENT:      Head: Normocephalic and atraumatic.   Eyes:      Pupils: Pupils are equal, round, and reactive to light.   Cardiovascular:       Rate and Rhythm: Normal rate.   Pulmonary:      Effort: Pulmonary effort is normal. No respiratory distress.   Abdominal:      Palpations: Abdomen is soft.      Tenderness: There is no abdominal tenderness.   Musculoskeletal:      Cervical back: Normal range of motion.      Comments: Currently in wheelchair.    Skin:     General: Skin is warm and dry.   Neurological:      Mental Status: He is alert and oriented to person, place, and time.      Coordination: Coordination normal.   Psychiatric:         Mood and Affect: Mood normal.         Behavior: Behavior normal.         Thought Content: Thought content normal.         Judgment: Judgment normal.         Assessment:       Problem List Items Addressed This Visit          Renal/    Hydronephrosis of left kidney    Benign prostatic hyperplasia with urinary obstruction - Primary     Other Visit Diagnoses       Hydroureter on left        Hydroureter on right        Suresh catheter present        Relevant Orders    Suresh catheter - discontinue (Completed)    Bilateral renal stones                Plan:           Blake was seen today for urinary retention and hospital follow up.    Diagnoses and all orders for this visit:    Benign prostatic hyperplasia with urinary obstruction    Hydroureter on left    Hydroureter on right    Suresh catheter present  -     Suresh catheter - discontinue    Bilateral renal stones    Hydronephrosis of left kidney    Other orders  Remove suresh catheter today in-clinic.   If patient is unable to urinate in 6 hours (by 8:30 PM), go to ER for evaluation.  If suresh is reinserted, please notify Urology Clinic in [place] at [phone] during business hours.   Continue taking doxazosin (Cardura) 8 mg daily for BPH.   Continue taking dutasteride (Avodart) 0.5 mg daily for BPH.   Follow-up as needed.     NOTE: family declined in-clinic cysto and 4 week follow-up. They will follow-up as needed.    Sapna Barragan, EDGARDO

## 2024-09-22 PROBLEM — N17.9 AKI (ACUTE KIDNEY INJURY): Status: ACTIVE | Noted: 2024-09-22

## 2024-09-25 PROBLEM — N17.9 AKI (ACUTE KIDNEY INJURY): Status: RESOLVED | Noted: 2024-09-22 | Resolved: 2024-09-25

## 2024-10-01 ENCOUNTER — PATIENT MESSAGE (OUTPATIENT)
Dept: UROLOGY | Facility: CLINIC | Age: 89
End: 2024-10-01
Payer: MEDICARE

## 2024-10-03 ENCOUNTER — PATIENT MESSAGE (OUTPATIENT)
Dept: UROLOGY | Facility: CLINIC | Age: 89
End: 2024-10-03
Payer: MEDICARE

## (undated) DEVICE — SHEATH SAFE ULTRA 9FR

## (undated) DEVICE — STOPCOCK 3-WAY

## (undated) DEVICE — SYR MED RAD 150ML

## (undated) DEVICE — SEE MEDLINE ITEM 152487

## (undated) DEVICE — GUIDEWIRE SUPRA CORE 035 190CM

## (undated) DEVICE — GUIDEWIRE EMERALD 150CM PTFE

## (undated) DEVICE — GUIDEWIRE STF .035X260CM STR

## (undated) DEVICE — KIT PROBE COVER WITH GEL

## (undated) DEVICE — PACK EP DRAPE

## (undated) DEVICE — SEE MEDLINE ITEM 156894

## (undated) DEVICE — WIRE AMPLATZ X-STIFF 035X300

## (undated) DEVICE — SHEATH INTRODUCER 6FR 11CM

## (undated) DEVICE — DRAPE OPTIMA MAJOR PEDIATRIC

## (undated) DEVICE — INTRODUCER HEMOSTASIS 6.5FR

## (undated) DEVICE — CATH RESPONSE QPLR JSN 6F 120

## (undated) DEVICE — CABLE PACER

## (undated) DEVICE — KIT CUSTOM MANIFOLD

## (undated) DEVICE — CATH TEMP PACER 5.0FR

## (undated) DEVICE — SHEATH INTRODUCER 8FR 11CM

## (undated) DEVICE — LINE 60IN PRESSURE MON.

## (undated) DEVICE — DEVICE PERCLOSE SUT CLSR 6FR

## (undated) DEVICE — SHEATH SAFE ULTRA 7FR

## (undated) DEVICE — INTRODUCER HEMOSTASIS 7.5F

## (undated) DEVICE — CATH HIS OCTAPOLAR 7FRX115CM

## (undated) DEVICE — ELECTRODE POLYHESIVEPRE-ATTACH

## (undated) DEVICE — PACK PACER PERMANENT

## (undated) DEVICE — SPIKE CONTRAST CONTROLLER

## (undated) DEVICE — SLING SWATHE UNIVERSAL FOAM

## (undated) DEVICE — LINE INJECTION 30IN 25/BX

## (undated) DEVICE — KIT MICROINTRO 4F .018X40X7CM

## (undated) DEVICE — OIL SILICONE SJM

## (undated) DEVICE — LINE INJECTION CLEARACIL 25.4

## (undated) DEVICE — BLLN LOMA VISTA TRUE 20MM

## (undated) DEVICE — CATH SUPREME QPLR CRD-2 6F 120

## (undated) DEVICE — OMNIPAQUE 350 200ML

## (undated) DEVICE — ADHESIVE DERMABOND ADVANCED

## (undated) DEVICE — CATH AL2 PRO-FLO XT 6FR 110CM

## (undated) DEVICE — PAD RADIOLUCENT STAT ADULT

## (undated) DEVICE — KIT PERCUTANEOUS SHEATH

## (undated) DEVICE — CATH MPA2 INFINITI 4FR 100CM

## (undated) DEVICE — CATH DXTERITY PG145 110CM 6FR